# Patient Record
Sex: FEMALE | Race: WHITE
[De-identification: names, ages, dates, MRNs, and addresses within clinical notes are randomized per-mention and may not be internally consistent; named-entity substitution may affect disease eponyms.]

---

## 2019-12-11 ENCOUNTER — HOSPITAL ENCOUNTER (OUTPATIENT)
Dept: HOSPITAL 84 - D.HCCECHO | Age: 74
Discharge: HOME | End: 2019-12-11
Attending: INTERNAL MEDICINE
Payer: MEDICARE

## 2019-12-11 DIAGNOSIS — I35.1: Primary | ICD-10-CM

## 2019-12-31 ENCOUNTER — HOSPITAL ENCOUNTER (OUTPATIENT)
Dept: HOSPITAL 84 - D.CATH | Age: 74
End: 2019-12-31
Attending: INTERNAL MEDICINE
Payer: MEDICARE

## 2019-12-31 VITALS — SYSTOLIC BLOOD PRESSURE: 179 MMHG | DIASTOLIC BLOOD PRESSURE: 61 MMHG

## 2019-12-31 VITALS — WEIGHT: 138.29 LBS | HEIGHT: 59 IN | BODY MASS INDEX: 27.88 KG/M2

## 2019-12-31 DIAGNOSIS — I35.0: ICD-10-CM

## 2019-12-31 DIAGNOSIS — I10: ICD-10-CM

## 2019-12-31 DIAGNOSIS — R07.9: Primary | ICD-10-CM

## 2019-12-31 LAB
ALT SERPL-CCNC: 25 U/L (ref 10–68)
ANION GAP SERPL CALC-SCNC: 13.3 MMOL/L (ref 8–16)
BASOPHILS NFR BLD AUTO: 0.8 % (ref 0–2)
BUN SERPL-MCNC: 13 MG/DL (ref 7–18)
CALCIUM SERPL-MCNC: 9 MG/DL (ref 8.5–10.1)
CHLORIDE SERPL-SCNC: 101 MMOL/L (ref 98–107)
CHOLEST/HDLC SERPL: 3.6 RATIO (ref 2.3–4.1)
CO2 SERPL-SCNC: 24.7 MMOL/L (ref 21–32)
CREAT SERPL-MCNC: 0.7 MG/DL (ref 0.6–1.3)
EOSINOPHIL NFR BLD: 6.2 % (ref 0–7)
ERYTHROCYTE [DISTWIDTH] IN BLOOD BY AUTOMATED COUNT: 12.7 % (ref 11.5–14.5)
GLUCOSE SERPL-MCNC: 104 MG/DL (ref 74–106)
HCT VFR BLD CALC: 36.7 % (ref 36–48)
HDLC SERPL-MCNC: 50 MG/DL (ref 32–96)
HGB BLD-MCNC: 12.2 G/DL (ref 12–16)
IMM GRANULOCYTES NFR BLD: 0.2 % (ref 0–5)
LDL-HDL RATIO: 2.3 RATIO (ref 1.5–3.5)
LDLC SERPL-MCNC: 115 MG/DL (ref 0–100)
LYMPHOCYTES NFR BLD AUTO: 36.1 % (ref 15–50)
MCH RBC QN AUTO: 31.5 PG (ref 26–34)
MCHC RBC AUTO-ENTMCNC: 33.2 G/DL (ref 31–37)
MCV RBC: 94.8 FL (ref 80–100)
MONOCYTES NFR BLD: 7.4 % (ref 2–11)
NEUTROPHILS NFR BLD AUTO: 49.3 % (ref 40–80)
OSMOLALITY SERPL CALC.SUM OF ELEC: 269 MOSM/KG (ref 275–300)
PLATELET # BLD: 384 10X3/UL (ref 130–400)
PMV BLD AUTO: 9.7 FL (ref 7.4–10.4)
POTASSIUM SERPL-SCNC: 4 MMOL/L (ref 3.5–5.1)
RBC # BLD AUTO: 3.87 10X6/UL (ref 4–5.4)
SODIUM SERPL-SCNC: 135 MMOL/L (ref 136–145)
TRIGL SERPL-MCNC: 86 MG/DL (ref 30–200)
WBC # BLD AUTO: 9.1 10X3/UL (ref 4.8–10.8)

## 2019-12-31 NOTE — HEMODYNAMI
PATIENT:MARLENA JUAN                                       MEDICAL RECORD: [id number]
: 47                                            LOCATION:D.CAT          
ACCT# [id number]                                       ADMISSION DATE: 19
 
 
 Generatedon:201912:42
Patient name: MARLENA JUAN Patient #: [id number] Visit #: [id number] SSN: [id number]
837 : 1945
Date of study: 2019
Page: Of
Hemodynamic Procedure Report
****************************
Patient Data
Patient Demographics
Procedure consent was obtained
First Name: MARLENA           Gender: Female
Last Name: DRAKE             : 1945
Patient #: [id number]       Age: 74 year(s)
Visit #: [id number]       Race: 
SSN: [id number]
Accession #:
[id number]
Additional ID: [id number]
Contact details
Address: [address] [place] [address]       Phone: [phone]
State: [place]
City: [place]
Zip code: [address]
Past Medical History
Performed procedures and imaging results
Date     Procedure          Procedure Results  Comments
Echocardiography
History of disease
Date         Diagnosis          Comments
Valvular heart
disease
Allergies
Allergen        Reaction        Date         Comments
Reported
Other allergy                   2019   AMLODIPINE,
LISINOPRIL, PCN,
PROCARDIA
Admission
Admission Data
Admission Date: 2019  Admission Time: 8:51
Lab Results
Lab Result Date: 2019 Lab Result Time: 0:00
Biochemistry
Name         Units    Result                Min      Max
BUN          mg/dl    13       --(--*-)--   7        18
Creatinine   mg/dl    0.7      --(*---)--   0.6      1.3
eGFR         ml/min   87       -*(----)--   90       120
NONAFRICAN
CBC
Name         Units    Result                Min      Max
Hematocrit   %        36.7     *-(----)--   42       54
Hemoglobin   g/dl     12.2     *-(----)--   13.5     17.5
Procedure
Procedure Types
 
Cath Procedure
Diagnostic Procedure
[facility] w/Coronaries
Procedure Description
Procedure Date
Procedure Date: 2019
Procedure Start Time: 12:32
Procedure End Time: 12:39
Procedure Staff
Name                            Function
Davi Nix MD                Performing Physician
Hiwot Love RT               Scrub
Stephanie Yu RT                Monitor
Ronnell Burroughs RT                     Circulator
Tommy Melgar RN                  Nurse
Procedure Data
Cath Procedure
Fluoroscopy
Diagnostic fluoroscopy      Total fluoroscopy Time: 0.9
time: 0.9 min               min
Diagnostic fluoroscopy      Total fluoroscopy dose: 297
dose: 297 mGy               mGy
Contrast Material
Contrast Material Type                       Amount (ml)
Isovue 370                                   35
Entry Location
Entry     Primary  Successful  Side  Size  Upsize Upsize Entry    Closure     Cortes
ccessful  Closure
Location                             (Fr)  1 (Fr) 2 (Fr) Remarks  Device        
          Remarks
Radial                         Right 6 Fr                         Mechanical
artery                               Short                        Compression
Estimated blood loss: 5 ml
Diagnostic catheters
Device Type               Used For           End Catheter
Placement
DIAGNOSTIC Whiting 110cm 5  Procedure
Fr catheter (552647)
Procedure Complications
No complications
Procedure Medications
Medication           Administration Route Dosage
Oxygen               etCO2 Nasal cannula  2 l/min
Lidocaine 2%         added to field       20
Heparin Flush Bag    added to field       2 bags
(1000units/500ml NS)
0.9% NaCl            I.V.                 100 ml/hr
Radial Cocktail      I.A.                 1 syringe
(Verapamil 2mg/Nitro
400mcg/Heparin
1500units)
Versed               I.V.                 1 mg
Fentanyl             I.V.                 50 mcg
Hemodynamics
Rest
HGB: 12.2 (g/dl) Heart Rate: 81 (bpm)
Pressure Samples
Time     Site     Value (mmHg) Purpose      Heart      Use
Rate(bpm)
 
12:34    LV       140/48,79    Snapshot     86
12:35    AO       98/48(68)    Pullback     79
12:35    LV       179/13,18    Pullback     79
Gradients
Valve  Time  Site 1    Site 2    Mean    SEP/DFP    Peak To Heart  Use
(mmHg)  (sec/min)  Peak    Rate
(mmHg)  (bpm)
Aortic 12:35 LV        AO        56      29         81      79
179/13,18 98/48(68)
Calculations
Valve    P-P      Mean      Valve     Index  Valve    Source
Name              Gradient  Area             Flow
(cm2)
Aortic   81       56
81       56
Snapshots
Pre Cath      Intra         NCS           Post Cath
Vital Signs
Time     Heart  Resp   SPO2 etCO2   NIBP (mmHg) Rhythm  Pain Status  Sedation
Rate   (ipm)  (%)  (mmHg)                                   Level
(bpm)
12:17:20 75     15     98   0       176/69(123) NSR     10 (11) ,    10(A)
Unimaginable
unspeakable
12:21:53 73     15     99   0       167/70(127) NSR     10 (11) ,    10(A)
Unimaginable
unspeakable
12:26:23 70     14     98   0       151/61(124) NSR     10 (11) ,    10(A)
Unimaginable
unspeakable
12:30:41 69     10     100  36.2    134/60(112) NSR     10 (11) ,    10(A)
Unimaginable
unspeakable
12:34:55 79     16     100  19.5    102/58(86)  NSR     0 (11) , No  9(A)
pain
12:39:09 75     13     99   30.1    108/50(81)  NSR     0 (11) , No  9(A)
pain
Medications
Time     Medication       Route   Dose    Verified Delivered Reason       Notes 
 Effectiveness
by       by
12:29:29 Oxygen           etCO2   2 l/min Davigeovanny [place]    used for
Nasal           Boyd Melgar RN   procedure
cannula
12:29:36 Lidocaine 2%     added   20ml    Davi Tomlinson   for local
to      vial    Boyd Nix MD  anesthetic
field
12:29:44 Heparin Flush    added   2 bags  Davi Tomlinson   used for
Bag              to              Boyd Nix MD  procedure
(1000units/500ml field
NS)
12:29:53 0.9% NaCl        I.V.    100     Davi [place]    Per
ml/hr   Boyd Melgar RN   physician
12:32:32 Versed           I.V.    1 mg    Davi [place]    for sedation
Boyd Melgar RN
12:32:38 Fentanyl         I.V.    50 mcg  Davi [place]    for sedation
Boyd Melgar RN
12:33:24 Radial Cocktail  I.A.    1       Davi Tomlinson   for
(Verapamil               syringe Boyd Nix MD  vasodilation
2mg/Nitro
 
400mcg/Heparin
1500units)
Procedure Log
Time     Note
12:04:06 Ronnell BARKER(R) sent for patient. Start room use.
12:04:28 Informed consent obtained and on chart
12:04:57 Procedure Status Elective Heart Cath (OP).
12:04:59 Time tracking: Regular hours (M-F 7:00 - 5:00)
12:05:04 Plan of Care:Hemodynamics will remain stable., Cardiac rhythm will
remain stable., Comfort level will be maintained., Respiratory function
will remain adequate., Patient/ family verbilizes understanding of
procedure., Procedure tolerated without complication., Recovers from
procedure without complications..
12:05:15 H&P Date Dictated: 2019 Within 30 days and on chart., H&P Addendu
m
completed by physician on day of procedure. (MUST COMPLETE FOR ALL
OUTPATIENTS).
12:05:43 Patient allergic to Other allergyAMLODIPINE, LISINOPRIL, PCN, PROCARDIA
12::38 Lab Result : BUN 13 mg/dl
12::38 Lab Result : Creatinine 0.7 mg/dl
12::38 Lab Result : eGFR NONAFRICAN 87 ml/min
12::38 Lab Result : Hemoglobin 12.2 g/dl
12::38 Lab Result : Hematocrit 36.7 %
12::40 Risk of Mortality: .2
12:09:45 Risk of blood transfusion: 2.1
12:09:50 Risk of JORDAN: .9
12:11:49 Procedure type changed to Cath procedure, Diagnostic procedure, LHC, LH
C
w/Coronaries
12:12:18 Patient received from Pre/Post Procedure Room to CCL 1 Alert and
oriented. Tansferred to table in Supine position.
12:12:20 Warm blankets applied, and camelia hugger turned on for patient comfort.
12:12:21 Correct patient and procedure confirmed by team.
12:12:21 ECG and BP/O2 sat monitors applied to patient.
12:15:59 Pre-procedure instructions explained to patient.
12:16:00 Pre-op teaching completed and patient verbalized understanding.
12:16:13 Family in waiting room.
12:16:15 Patient NPO since Midnight.
12:16:17 -----------------------------------------------------------------------
-
12:16:21 Is the patient allergic to Iodine/contrast media? No.
12:16:23 Was the patient premedicated? Yes
12:16:40 Lab results completed and on chart.
12:16:45 Stress Test: no; N/A ?
12:17:12 IV patent on arrival in right antecubital with 0.9% NaCl at KVO.
12:17:17 Pre procedure: right dorsailis pedis pulse 2+ Normal; easily
identifiable; not easily obliterated
12:17:19 Modified Neeraj's test Ulnar < 7 seconds
12:17:22 Patient pain scale 10/10 allover pain.
12:17:29 Right Radial & Right Groin area was prepped with chlora-prep and draped
in sterile fashion
12:17:30 Alarms reviewed by R. N.
12:17:31 Sharps counted by scrub and verified by R.N.
12:19:58 Previous problem with sedation/anesthesia? No ?
12:19:59 Snore? Yes
12:20:02 Sleep apnea? No
12:20:04 Deviated septum? No
12:20:05 Opens mouth fully? Yes
12:20:06 Sticks out tongue? Yes
12:20:11 Airway obstruction? No ?
 
12:20:20 Dentures? No ?
12:20:27 Vital chart was started
12:20:28 Baseline sample Acquired.
12:20:55 Baseline sample Acquired.
12:20:59 Rhythm: sinus rhythm
12:21:07 Is patient on blood thinner?No
12:21:10 Patient diabetic? No.
12:21:13 Patient not pregnant. Patient is over age 55.
12:21:16 ----Pre-sedation anethsthesia assessment.----
12:21:25 Use device set Radial Dx or PCI
12:21:27 ACIST Syringe (74639) opened to sterile field.
12:21:27 Medline Cath Pack ([id number]) opened to sterile field.
12:21:28 Bag Decanter () opened to sterile field.
12:21:29 ACIST Hand Control (51607) opened to sterile field.
12:21:29 ACIST Manifold (24895) opened to sterile field.
12:21:31 Tegaderm 4 x 4 (1626W) opened to sterile field.
12:21:32 MBrace Wrist Support (950913191) opened to sterile field.
12:21:33 EMERALD Guide Wire (788-379) opened to sterile field.
12:21:34 SHEATH 6FR RAIN (4596924) opened to sterile field.
12:29:29 Oxygen 2 l/min etCO2 Nasal cannula was administered by Tommy Melgar RN;
used for procedure; Verbal order read back and verified.
12:29:36 Lidocaine 2% 20ml vial added to field was administered by Davi Nix MD; for local anesthetic; Verbal order read back and verified.
12:29:44 Heparin Flush Bag (1000units/500ml NS) 2 bags added to field was
administered by Davi Nix MD; used for procedure; Verbal order read
back and verified.
12:29:53 0.9% NaCl 100 ml/hr I.V. was administered by Tommy Melgar RN; Per
physician; Verbal order read back and verified.
12:31:32 --------ALL STOP TIME OUT------
12:31:33 Final Timeout: patient, procedure, and site verified with staff and
physician. All members of the team are in agreement.
12:31:35 Right Radial & Right Groin site verified by team.
12:31:39 Fire Safety Assessment: A--An alcohol-based skin anteseptic being used
preoperatively., C--Open oxygen or nitrous oxide is being used., D--An
ESU, laser, or fiber-optic light is being used.
12:31:43 Physical assessment completed. ASA score P 2 - A patient with mild
systemic disease as per Davi Nix MD.
12:31:49 2) 60-89 Mildly reduced kidney function, and other findings (as for
stage 1) point to kidney disease.
12:31:52 Maximum allowable contrast dose (3.7 X eGFR X 0.75)240 ml.
12:31:55 Sedation plan: IV Moderate Sedation Medication:Versed, Fentanyl
12:32:00 Procedure started.
12:32:00 Full Disclosure recording started
12:32:10 Local anesthetic to right radial artery with Lidocaine 2% by Davi Nix MD.**INITIAL ACCESS ONLY**
12:32:32 Versed 1 mg I.V. was administered by Tommy Melgar RN; for sedation;
Verbal order read back and verified.
12:32:38 Fentanyl 50 mcg I.V. was administered by Tommy Melgar RN; for sedation;
Verbal order read back and verified.
12:32:54 A 6 Fr Short sheath was inserted into the Right Radial artery
12:33:24 Radial Cocktail (Verapamil 2mg/Nitro 400mcg/Heparin 1500units) 1 syring
e
I.A. was administered by Davi Nix MD; for vasodilation; Verbal
order read back and verified.
12:34:19 A DIAGNOSTIC Tiger 110cm 5 Fr catheter (203707) was advanced over the
wire and used for Procedure.
12:34:23 Injector settings: Ml/sec: 5, Volume: 15,
12:34:28 LV gram done using MEJIA
12:34:57 LV hemodynamics recorded.
12:35:25 EF : 65 %
 
12:35:28 LCA angiography performed.
12:36:11 RCA angiography performed.
12:36:31 **ACC**Dominant side:Right
12:36:38 Catheter removed.
12:37:21 Sheath removed intact; hemostasis achieved with Mechanical Compression
to the Right Radial artery.
12:37:24 Procedure ended.(Physican Out)
12:37:33 Fluoroscopy time 00.90 minutes.
12:37:37 Fluoroscopy dose: 297 mGy
12:37:37 Flurop Dose total: 297
12:37:42 Dose Area Product 49250 mGy/cm.
12:37:47 Contrast amount:Isovue 370 35ml.
12:37:49 Maximum allowable dose exceeded? No.
12:37:51 Sharps counted by scrub and verified by R.N.
12:37:54 Hartley band inflated with 9cc of air.
12:37:57 Post Procedure Pulses reassessed and unchanged
12:38:01 Post procedure: right dorsailis pedis pulse 2+ Normal; easily
identifiable; not easily obliterated.
12:38:05 Post-procedure physical assessment completed. ASA score P 2 - A patient
with mild systemic disease as per Davi Nix MD.
12:38:08 Post procedure rhythm: unchanged.
12:38:11 Estimated blood loss: 5 ml
12:38:12 Post procedure instruction explained to patient.Patient verbalizes
understanding.
12:38:13 Patient needs reinforcement of post procedure teaching.
12:39:29 Procedure and supply charges have been captured, reviewed, submitted an
d
are correct.
12:39:34 Procedure Complication : No complications
12:39:37 Vital chart was stopped
12:39:39 [facility] Findings: mild to moderate CAD (<70%)
12:39:40 Operative report dictated upon procedure completion.
12:39:41 See physician's report for complete and final results.
12:39:44 Report given to Pre/Post Procedure Room.
12:39:55 Patient transfered to Pre/Post Procedure Room with Stretcher.
12:39:57 Procedure ended.
12:39:57 Full Disclosure recording stopped
12:40:05 End room use (Document Last)
12:40:16 End room use (Document Last)
12:40:37 End room use (Document Last)
Device Usage
Item Name   Manufacture  Quantity  Catalog      Hospital Part     Current Minima
l Lot# /
Number       Charge   Number   Stock   Stock   Serial#
Code
ACIST       Acist        1         38270        985354   831796   560882  20
Syringe     Medical
(26108)     Systems Inc
Medline     Medline      1         [id number]    341784   61562    140766  5
Cath Pack
([id number])
Bag         Microtek     1         S        144805   30312    291896  5
Decanter    Medical Inc.
(S)
ACIST Hand  Acist        1         19782        151548   021605   417876  5
Control     Medical
(88103)     Systems Inc
ACIST       Acist        1         59623        932604   141379   321225  5
Manifold    Medical
(84088)     Systems Inc
 
Tegaderm 4  3M           1         1626W        423919   986542   832390  5
x 4 (1626W)
MBrace      Advanced     1         140-0250-00  403737   46871    562318  5
Wrist       Vascular
Support     Dynamics
(631059554)
EMERALD     Cardinal     1         477-513      882802   369886   638254  5
Guide Wire  Health
(479-290)
SHEATH 6FR  Cardinal     1         5220147      423554   4643100  758860  5
[facility]
(4449162)
DIAGNOSTIC  Terumo       1               881304   605341   909727  5
Tiger 110cm
5 Fr
catheter
(945091)
Signature Audit [place]
Stage           Time        Signature      Unsigned
Intra-Procedure 2019  Stephanie Yu
12:40:16 PM RT(R)
Intra-Procedure 2019  Tommy Melgar RN
12:40:38 PM
Intra-Procedure 2019  Davi Nix
12:42:50 PM MD
 
 
 
 
 
 
 
 
 
 
 
 
 
 
 
 
 
 
 
 
 
 
 
 
 
 
 
 
 
 
[facility]                                          
[address] [place] [address]

## 2019-12-31 NOTE — NUR
REMAINING  AIR REMOVED FROM Z BAND, NO S/S OF BLEEDING OR HEMATOMA.
NO C/O PAIN, NUMBNESS, OR TINGLING. DRESSING APPLIED IS CDI. VSS ON
ROOM AIR. PATIENT TOLERAATING PO FLUIDS AND FOOD. IV REMOVED. PATIENT
DISCONNECTED FROM MONITORS TO GET DRESSED. NO N/V.

## 2019-12-31 NOTE — NUR
PATIENT RESTING, FAMILY PRESENT AT BEDSIDE. VSS ON ROOM AIR. RIGHT
RADIAL DRESSING IS CDI, NO S/S OF BLEEDING OR HEMATOMA. NO C/O PAIN,
NUMBNESS, OR TINGLING. NO N/V.

## 2019-12-31 NOTE — NUR
3CC OF AIR REMOVED, NO S/S OF BLEEDING OR HEMATOMA. NO C/O PAIN,
NUMBNESS, OR TINGLING. NO N/V, TOLERATING PO FLUIDS. VSS ON ROOM AIR.
FAMILY PRESENT AT BEDSIDE.

## 2019-12-31 NOTE — NUR
PATIENT VOIDED WITHOUT DIFFICULTY. RIGHT RADIAL DRESSING IS CDI, NO
S/S OF BLEEDING OR HEMATOMA. PATIENT TRANSPORTED VIA WHEELCHAIR TO
CAR WITH FAMILY DRIVING. ALL BELONGINGS WITH PATIENT.

## 2019-12-31 NOTE — OP
PATIENT NAME:  MARLENA JUAN                                MEDICAL RECORD: [id number]
:47                                             LOCATION:D.CAT          
                                                         ADMISSION DATE:        
SURGEON:  RADHA DAVALOS MD             
 
 
DATE OF OPERATION:  2019
 
Date of catheterization is 2019.
 
INDICATION:  Aortic valve replacement.
 
PROCEDURE IN DETAIL:  After informed consent was obtained and after detailed
description of risks, benefits as well as alternative therapies, the patient
elected to proceed with angiogram and heart catheterization.  The right radial
area was prepped and draped in normal sterile fashion.  Radial artery was
cannulated via modified Seldinger technique with placement of 5-Maldivian sheath. 
All catheters exchanged through this sheath.
 
FINDINGS:  Left ventriculogram was performed in standard 30-degree MEJIA view,
reveals normal cardiac wall motion, ejection fraction 65%.  There is a 100-mm
gradient across the aortic valve.
 
SELECTIVE CORONARY ANGIOGRAPHY:  Left main, left anterior descending, left
circumflex, right coronary artery has mild irregularities, but no flow-limiting
stenosis.
 
OVERALL IMPRESSION:
1.  No significant coronary artery disease is present.
2.  Severe aortic stenosis, evaluate for aortic valve replacement.
 
TRANSINT:[id number] Voice Confirmation ID: [id number] DOCUMENT ID: [id number]
                                           
                                           RADHA DAVALOS MD             
 
 
 
 
 
 
 
 
 
 
 
 
 
 
 
 
CC:                                                             [id number]
DICTATION DATE: 19 1241     :     19 2147      [facility] CLI 
                                                                      [address] [place] [address]

## 2019-12-31 NOTE — NUR
PATIENT RESTING, VSS ON ROOM AIR. RIGHT Z BAND WITH IMMOBILIZER IN
PLACE, NO S/S OF BLEEDING OR HEMATOMA. NO C/O PAIN, NUMBNESS, OR
TINGLING. NO N/V.

## 2019-12-31 NOTE — NUR
WRITTEN AND VERBAL DISCHARGE INSTRUCTIONS GIVEN TO PATIENT AND
FAMILY, BOTH VOICE UNDERSTANDING. RIGHT RADIAL DRESSING IS CDI, NO
S/S OF BLEEDING OR HEMATOMA. NO N/V. NO C/O PAIN, NUMBNESS,
OR TINGLING.

## 2019-12-31 NOTE — NUR
PATIENT ARRIVED TO ROOM 4, PLACED ON CM. VSS ON ROOM AIR. RIGHT Z
BAND IN PLACE, NO S/S OF BLEEDING OR HEMATOMA. FAMILY PRESENT AT
BEDSIDE PREVIOUSLY UPDATED BY PHYSICIAN.

## 2020-01-09 ENCOUNTER — HOSPITAL ENCOUNTER (INPATIENT)
Dept: HOSPITAL 84 - D.SDCHOLD | Age: 75
LOS: 10 days | DRG: 219 | End: 2020-01-19
Attending: THORACIC SURGERY (CARDIOTHORACIC VASCULAR SURGERY) | Admitting: THORACIC SURGERY (CARDIOTHORACIC VASCULAR SURGERY)
Payer: MEDICARE

## 2020-01-09 VITALS
BODY MASS INDEX: 28.26 KG/M2 | WEIGHT: 140.19 LBS | BODY MASS INDEX: 28.26 KG/M2 | HEIGHT: 59 IN | WEIGHT: 140.19 LBS | BODY MASS INDEX: 28.26 KG/M2 | HEIGHT: 59 IN | BODY MASS INDEX: 28.26 KG/M2

## 2020-01-09 DIAGNOSIS — J90: ICD-10-CM

## 2020-01-09 DIAGNOSIS — I10: ICD-10-CM

## 2020-01-09 DIAGNOSIS — E87.2: ICD-10-CM

## 2020-01-09 DIAGNOSIS — I95.9: ICD-10-CM

## 2020-01-09 DIAGNOSIS — B96.5: ICD-10-CM

## 2020-01-09 DIAGNOSIS — I26.99: ICD-10-CM

## 2020-01-09 DIAGNOSIS — I48.91: ICD-10-CM

## 2020-01-09 DIAGNOSIS — I35.0: Primary | ICD-10-CM

## 2020-01-09 DIAGNOSIS — R57.0: ICD-10-CM

## 2020-01-09 DIAGNOSIS — D62: ICD-10-CM

## 2020-01-09 DIAGNOSIS — N39.0: ICD-10-CM

## 2020-01-09 LAB
ALBUMIN SERPL-MCNC: 4.3 G/DL (ref 3.4–5)
ALP SERPL-CCNC: 53 U/L (ref 46–116)
ALT SERPL-CCNC: 24 U/L (ref 10–68)
ANION GAP SERPL CALC-SCNC: 11 MMOL/L (ref 8–16)
APPEARANCE UR: (no result)
APTT BLD: 37 SECONDS (ref 22.8–39.4)
BACTERIA #/AREA URNS HPF: (no result) /HPF
BASOPHILS NFR BLD AUTO: 0.5 % (ref 0–2)
BILIRUB SERPL-MCNC: 0.31 MG/DL (ref 0.2–1.3)
BILIRUB SERPL-MCNC: NEGATIVE MG/DL
BUN SERPL-MCNC: 14 MG/DL (ref 7–18)
CALCIUM SERPL-MCNC: 9.5 MG/DL (ref 8.5–10.1)
CHLORIDE SERPL-SCNC: 101 MMOL/L (ref 98–107)
CO2 SERPL-SCNC: 29.5 MMOL/L (ref 21–32)
COLOR UR: YELLOW
CREAT SERPL-MCNC: 0.7 MG/DL (ref 0.6–1.3)
EOSINOPHIL NFR BLD: 3.7 % (ref 0–7)
ERYTHROCYTE [DISTWIDTH] IN BLOOD BY AUTOMATED COUNT: 12.6 % (ref 11.5–14.5)
EST. AVERAGE GLUCOSE BLD GHB EST-MCNC: 103 MG/DL (ref 74–154)
GLOBULIN SER-MCNC: 3.6 G/L
GLUCOSE SERPL-MCNC: 94 MG/DL (ref 74–106)
GLUCOSE SERPL-MCNC: NEGATIVE MG/DL
HCT VFR BLD CALC: 38.6 % (ref 36–48)
HGB BLD-MCNC: 12.8 G/DL (ref 12–16)
IMM GRANULOCYTES NFR BLD: 0.2 % (ref 0–5)
INR PPP: 0.95 (ref 0.85–1.17)
KETONES UR STRIP-MCNC: NEGATIVE MG/DL
LYMPHOCYTES NFR BLD AUTO: 35.1 % (ref 15–50)
MCH RBC QN AUTO: 31.3 PG (ref 26–34)
MCHC RBC AUTO-ENTMCNC: 33.2 G/DL (ref 31–37)
MCV RBC: 94.4 FL (ref 80–100)
MONOCYTES NFR BLD: 6.7 % (ref 2–11)
MUCOUS THREADS #/AREA URNS LPF: (no result) /LPF
NEUTROPHILS NFR BLD AUTO: 53.8 % (ref 40–80)
NITRITE UR-MCNC: NEGATIVE MG/ML
OSMOLALITY SERPL CALC.SUM OF ELEC: 276 MOSM/KG (ref 275–300)
PH UR STRIP: 5 [PH] (ref 5–6)
PHOSPHATE SERPL-MCNC: 3.5 MG/DL (ref 2.5–4.9)
PLATELET # BLD: 354 10X3/UL (ref 130–400)
PMV BLD AUTO: 10.2 FL (ref 7.4–10.4)
POTASSIUM SERPL-SCNC: 3.5 MMOL/L (ref 3.5–5.1)
PROT SERPL-MCNC: 7.9 G/DL (ref 6.4–8.2)
PROT UR-MCNC: NEGATIVE MG/DL
PROTHROMBIN TIME: 12.7 SECONDS (ref 11.6–15)
RBC # BLD AUTO: 4.09 10X6/UL (ref 4–5.4)
RBC #/AREA URNS HPF: (no result) /HPF (ref 0–5)
SODIUM SERPL-SCNC: 138 MMOL/L (ref 136–145)
SP GR UR STRIP: 1.02 (ref 1–1.02)
SQUAMOUS #/AREA URNS HPF: (no result) /HPF (ref 0–5)
T4 FREE SERPL-MCNC: 1.12 NG/DL (ref 0.76–1.46)
TSH SERPL-ACNC: 1.45 UIU/ML (ref 0.36–3.74)
URATE SERPL-MCNC: 3.1 MG/DL (ref 2.6–7.2)
UROBILINOGEN UR-MCNC: NORMAL MG/DL
WBC # BLD AUTO: 10.2 10X3/UL (ref 4.8–10.8)
WBC #/AREA URNS HPF: (no result) /HPF

## 2020-01-09 NOTE — OP
PATIENT NAME:  MARLENA JUAN                                MEDICAL RECORD: [id number]
:47                                             LOCATION:[facility]    D.CV04
                                                         ADMISSION DATE:20
SURGEON:  NEY MATA MD            
 
 
DATE OF OPERATION:  2020
 
SURGEON:  Ney Mata MD
 
ANESTHESIOLOGIST:  Dr. Sandoval.
 
PREOPERATIVE DIAGNOSIS:  Cardiogenic shock and CPR.
 
PROCEDURE:  ICU sternotomy and mediastinal exploration with open cardiac
massage.
 
POSTOPERATIVE DIAGNOSIS:  Probable pulmonary embolus.
 
DESCRIPTION OF PROCEDURE:  In the intensive care unit, code blue protocol was
undertaken.  I arrived to find the patient with vital signs about 80/30 a
junctional rhythm in the 70-80 range.  Right groin was prepped and draped. 
Right femoral line insertion.  Initially, the artery was hit, the blood was red.
 The femoral arterial venous line insertion was then taken over by cardiology
and the chest was quickly prepped.  Sutures removed and wires removed.  Chest
opened.  There was no blood within the pericardium.  The aortic pressure was
approximately 80.  There was no evidence of dissection.  The right heart was
appropriately filled.  Open cardiac massage was undertaken.  Additional
resuscitation was given and the patient was eventually returned to a normal
blood pressure about 110/70 for about 20 minutes using low dose dobutamine and
relatively high dose epinephrine, the acidosis was corrected.  The patient's
blood pressure began to spiral down and intermittent open cardiac massage was
necessary more frequently.  Eventually under the direction of anesthesia,
Levophed and vasopressin were given with no ability to maintain blood pressure. 
It was felt that returned to cardiopulmonary bypass would be unsuccessful due to
the inability to maintain a blood pressure in this patient even with open
cardiac massage death was declared.  The chest was closed with wires and suture.
 
TRANSINT:[id number] Voice Confirmation ID: [id number] DOCUMENT ID: [id number]
                                           
                                           NEY MATA MD            
 
 
 
 
 
 
 
 
 
CC:                                                             [id number]
DICTATION DATE: 20 1330     :     20 1837      DIS IN  
                                                                      20
[facility]                                          
[address] [id number]

## 2020-01-09 NOTE — NUR
NOTIFIED  AND DANE GRACIA RN OF BLOOD PRESSURE 205/63 RIGHT ARM,
BLOOD PRESSURE 176/64 LEFT ARM. DANE VISITED WITH PATIENT AND HER SON AND
DAUGHTER IN ROOM 2501 TO ANSWER THEIR QUESTIONS.

## 2020-01-13 VITALS — SYSTOLIC BLOOD PRESSURE: 129 MMHG | DIASTOLIC BLOOD PRESSURE: 52 MMHG

## 2020-01-13 VITALS — SYSTOLIC BLOOD PRESSURE: 147 MMHG | DIASTOLIC BLOOD PRESSURE: 53 MMHG

## 2020-01-13 VITALS — SYSTOLIC BLOOD PRESSURE: 122 MMHG | DIASTOLIC BLOOD PRESSURE: 53 MMHG

## 2020-01-13 VITALS — DIASTOLIC BLOOD PRESSURE: 46 MMHG | SYSTOLIC BLOOD PRESSURE: 113 MMHG

## 2020-01-13 VITALS — DIASTOLIC BLOOD PRESSURE: 49 MMHG | SYSTOLIC BLOOD PRESSURE: 128 MMHG

## 2020-01-13 VITALS — SYSTOLIC BLOOD PRESSURE: 126 MMHG | DIASTOLIC BLOOD PRESSURE: 51 MMHG

## 2020-01-13 VITALS — DIASTOLIC BLOOD PRESSURE: 49 MMHG | SYSTOLIC BLOOD PRESSURE: 125 MMHG

## 2020-01-13 VITALS — SYSTOLIC BLOOD PRESSURE: 141 MMHG | DIASTOLIC BLOOD PRESSURE: 53 MMHG

## 2020-01-13 VITALS — DIASTOLIC BLOOD PRESSURE: 49 MMHG | SYSTOLIC BLOOD PRESSURE: 133 MMHG

## 2020-01-13 VITALS — DIASTOLIC BLOOD PRESSURE: 44 MMHG | SYSTOLIC BLOOD PRESSURE: 113 MMHG

## 2020-01-13 VITALS — SYSTOLIC BLOOD PRESSURE: 143 MMHG | DIASTOLIC BLOOD PRESSURE: 55 MMHG

## 2020-01-13 VITALS — SYSTOLIC BLOOD PRESSURE: 118 MMHG | DIASTOLIC BLOOD PRESSURE: 46 MMHG

## 2020-01-13 VITALS — SYSTOLIC BLOOD PRESSURE: 125 MMHG | DIASTOLIC BLOOD PRESSURE: 52 MMHG

## 2020-01-13 VITALS — SYSTOLIC BLOOD PRESSURE: 135 MMHG | DIASTOLIC BLOOD PRESSURE: 57 MMHG

## 2020-01-13 VITALS — SYSTOLIC BLOOD PRESSURE: 115 MMHG | DIASTOLIC BLOOD PRESSURE: 45 MMHG

## 2020-01-13 VITALS — DIASTOLIC BLOOD PRESSURE: 49 MMHG | SYSTOLIC BLOOD PRESSURE: 132 MMHG

## 2020-01-13 VITALS — DIASTOLIC BLOOD PRESSURE: 48 MMHG | SYSTOLIC BLOOD PRESSURE: 110 MMHG

## 2020-01-13 VITALS — DIASTOLIC BLOOD PRESSURE: 51 MMHG | SYSTOLIC BLOOD PRESSURE: 137 MMHG

## 2020-01-13 VITALS — SYSTOLIC BLOOD PRESSURE: 152 MMHG | DIASTOLIC BLOOD PRESSURE: 46 MMHG

## 2020-01-13 VITALS — DIASTOLIC BLOOD PRESSURE: 64 MMHG | SYSTOLIC BLOOD PRESSURE: 193 MMHG

## 2020-01-13 VITALS — DIASTOLIC BLOOD PRESSURE: 51 MMHG | SYSTOLIC BLOOD PRESSURE: 144 MMHG

## 2020-01-13 VITALS — SYSTOLIC BLOOD PRESSURE: 129 MMHG | DIASTOLIC BLOOD PRESSURE: 48 MMHG

## 2020-01-13 VITALS — SYSTOLIC BLOOD PRESSURE: 112 MMHG | DIASTOLIC BLOOD PRESSURE: 44 MMHG

## 2020-01-13 VITALS — DIASTOLIC BLOOD PRESSURE: 52 MMHG | SYSTOLIC BLOOD PRESSURE: 140 MMHG

## 2020-01-13 VITALS — DIASTOLIC BLOOD PRESSURE: 51 MMHG | SYSTOLIC BLOOD PRESSURE: 126 MMHG

## 2020-01-13 VITALS — SYSTOLIC BLOOD PRESSURE: 138 MMHG | DIASTOLIC BLOOD PRESSURE: 49 MMHG

## 2020-01-13 VITALS — DIASTOLIC BLOOD PRESSURE: 44 MMHG | SYSTOLIC BLOOD PRESSURE: 127 MMHG

## 2020-01-13 VITALS — DIASTOLIC BLOOD PRESSURE: 46 MMHG | SYSTOLIC BLOOD PRESSURE: 124 MMHG

## 2020-01-13 VITALS — SYSTOLIC BLOOD PRESSURE: 138 MMHG | DIASTOLIC BLOOD PRESSURE: 54 MMHG

## 2020-01-13 VITALS — DIASTOLIC BLOOD PRESSURE: 49 MMHG | SYSTOLIC BLOOD PRESSURE: 114 MMHG

## 2020-01-13 VITALS — SYSTOLIC BLOOD PRESSURE: 140 MMHG | DIASTOLIC BLOOD PRESSURE: 53 MMHG

## 2020-01-13 VITALS — SYSTOLIC BLOOD PRESSURE: 120 MMHG | DIASTOLIC BLOOD PRESSURE: 51 MMHG

## 2020-01-13 VITALS — SYSTOLIC BLOOD PRESSURE: 143 MMHG | DIASTOLIC BLOOD PRESSURE: 52 MMHG

## 2020-01-13 VITALS — SYSTOLIC BLOOD PRESSURE: 138 MMHG | DIASTOLIC BLOOD PRESSURE: 51 MMHG

## 2020-01-13 VITALS — DIASTOLIC BLOOD PRESSURE: 51 MMHG | SYSTOLIC BLOOD PRESSURE: 136 MMHG

## 2020-01-13 VITALS — DIASTOLIC BLOOD PRESSURE: 47 MMHG | SYSTOLIC BLOOD PRESSURE: 142 MMHG

## 2020-01-13 VITALS — DIASTOLIC BLOOD PRESSURE: 50 MMHG | SYSTOLIC BLOOD PRESSURE: 128 MMHG

## 2020-01-13 VITALS — SYSTOLIC BLOOD PRESSURE: 137 MMHG | DIASTOLIC BLOOD PRESSURE: 51 MMHG

## 2020-01-13 VITALS — DIASTOLIC BLOOD PRESSURE: 53 MMHG | SYSTOLIC BLOOD PRESSURE: 136 MMHG

## 2020-01-13 VITALS — DIASTOLIC BLOOD PRESSURE: 46 MMHG | SYSTOLIC BLOOD PRESSURE: 129 MMHG

## 2020-01-13 VITALS — DIASTOLIC BLOOD PRESSURE: 54 MMHG | SYSTOLIC BLOOD PRESSURE: 137 MMHG

## 2020-01-13 VITALS — SYSTOLIC BLOOD PRESSURE: 127 MMHG | DIASTOLIC BLOOD PRESSURE: 52 MMHG

## 2020-01-13 VITALS — SYSTOLIC BLOOD PRESSURE: 116 MMHG | DIASTOLIC BLOOD PRESSURE: 44 MMHG

## 2020-01-13 VITALS — DIASTOLIC BLOOD PRESSURE: 54 MMHG | SYSTOLIC BLOOD PRESSURE: 152 MMHG

## 2020-01-13 VITALS — DIASTOLIC BLOOD PRESSURE: 57 MMHG | SYSTOLIC BLOOD PRESSURE: 138 MMHG

## 2020-01-13 VITALS — DIASTOLIC BLOOD PRESSURE: 50 MMHG | SYSTOLIC BLOOD PRESSURE: 131 MMHG

## 2020-01-13 VITALS — SYSTOLIC BLOOD PRESSURE: 127 MMHG | DIASTOLIC BLOOD PRESSURE: 48 MMHG

## 2020-01-13 VITALS — SYSTOLIC BLOOD PRESSURE: 147 MMHG | DIASTOLIC BLOOD PRESSURE: 61 MMHG

## 2020-01-13 VITALS — SYSTOLIC BLOOD PRESSURE: 113 MMHG | DIASTOLIC BLOOD PRESSURE: 50 MMHG

## 2020-01-13 VITALS — SYSTOLIC BLOOD PRESSURE: 139 MMHG | DIASTOLIC BLOOD PRESSURE: 52 MMHG

## 2020-01-13 VITALS — SYSTOLIC BLOOD PRESSURE: 105 MMHG | DIASTOLIC BLOOD PRESSURE: 42 MMHG

## 2020-01-13 LAB
APPEARANCE UR: CLEAR
APTT BLD: 35.8 SECONDS (ref 22.8–39.4)
BACTERIA #/AREA URNS HPF: (no result) /HPF
BASOPHILS NFR BLD AUTO: 0.1 % (ref 0–2)
BILIRUB SERPL-MCNC: NEGATIVE MG/DL
COLOR UR: (no result)
EOSINOPHIL NFR BLD: 0.7 % (ref 0–7)
ERYTHROCYTE [DISTWIDTH] IN BLOOD BY AUTOMATED COUNT: 13.9 % (ref 11.5–14.5)
GLUCOSE SERPL-MCNC: NEGATIVE MG/DL
HCT VFR BLD CALC: 24.4 % (ref 36–48)
HGB BLD-MCNC: 8.1 G/DL (ref 12–16)
IMM GRANULOCYTES NFR BLD: 0.4 % (ref 0–5)
INR PPP: 1.4 (ref 0.85–1.17)
KETONES UR STRIP-MCNC: NEGATIVE MG/DL
LYMPHOCYTES NFR BLD AUTO: 13.4 % (ref 15–50)
MCH RBC QN AUTO: 30.3 PG (ref 26–34)
MCHC RBC AUTO-ENTMCNC: 33.2 G/DL (ref 31–37)
MCV RBC: 91.4 FL (ref 80–100)
MONOCYTES NFR BLD: 7.7 % (ref 2–11)
NEUTROPHILS NFR BLD AUTO: 77.7 % (ref 40–80)
NITRITE UR-MCNC: NEGATIVE MG/ML
PH UR STRIP: 5 [PH] (ref 5–6)
PLATELET # BLD: 185 10X3/UL (ref 130–400)
PMV BLD AUTO: 9.7 FL (ref 7.4–10.4)
PROT UR-MCNC: NEGATIVE MG/DL
PROTHROMBIN TIME: 17 SECONDS (ref 11.6–15)
RBC # BLD AUTO: 2.67 10X6/UL (ref 4–5.4)
RBC #/AREA URNS HPF: (no result) /HPF (ref 0–5)
SP GR UR STRIP: 1.02 (ref 1–1.02)
SQUAMOUS #/AREA URNS HPF: (no result) /HPF (ref 0–5)
UROBILINOGEN UR-MCNC: NORMAL MG/DL
WBC # BLD AUTO: 12.4 10X3/UL (ref 4.8–10.8)
WBC #/AREA URNS HPF: (no result) /HPF

## 2020-01-13 PROCEDURE — 02RF08Z REPLACEMENT OF AORTIC VALVE WITH ZOOPLASTIC TISSUE, OPEN APPROACH: ICD-10-PCS | Performed by: THORACIC SURGERY (CARDIOTHORACIC VASCULAR SURGERY)

## 2020-01-13 PROCEDURE — B24BZZ4 ULTRASONOGRAPHY OF HEART WITH AORTA, TRANSESOPHAGEAL: ICD-10-PCS | Performed by: THORACIC SURGERY (CARDIOTHORACIC VASCULAR SURGERY)

## 2020-01-13 PROCEDURE — 5A1221Z PERFORMANCE OF CARDIAC OUTPUT, CONTINUOUS: ICD-10-PCS | Performed by: THORACIC SURGERY (CARDIOTHORACIC VASCULAR SURGERY)

## 2020-01-13 NOTE — NUR
CALLED DR. MATA ABOUT URINE OUTPUT 28 ML/HR X 3 HOURS, AND UPDATED HIM ON
B/P, AND VITALS, NEW ORDERS NOTED

## 2020-01-13 NOTE — NUR
PT C/O PAIN THAT HAS NOT BEEN COMPLETELY RESOLVED BY THE LAST DOSE OF PAIN
MED, PT GIVEN PRN MORPHINE AT THIS TIME

## 2020-01-13 NOTE — NUR
PT ASSESSEMENT COMPLETED AT THIS TIME, NO CHANGES NOTED FROM NURSE REPORT, PT
DROWSY, WAKES UP AND ANSWERS QUESTION APPROPRIATELY. VSS, WILL MONITOR FOR
CHANGES

## 2020-01-13 NOTE — NUR
0550-SPOKE WITH PHANI IN PHARMACY, ANTIBIOTIC NOT IN MED ROOM AND
NITRO PASTE TYSHAWN EMPTY. PHONE REQUEST MADE FOR STOCK.
1972-REQUESTED MEDICATION DELIVERED TO UNIT.

## 2020-01-13 NOTE — NUR
1141 PT ARRIVED TO ROOM WITH OR TEAM VSS ATTACHED TO MONITORING EQUIPMENT AND
ALARMS SET, ETT SECURED R IJ NELLIAN VON LOCKED AT APPROX 50CM THEN MOVED BY DR MATA TO APPROX 40CM, MIDSTERNAL AND SUBSTERNAL DRESSINGS CDI WITH TPM WIRES
SECURED, ATTACHED TO TPM, TPM OFF, CT TO 20CM SUCTION NO AIR LEAK, CRITICORE
DUMAS DRAINING YELLOW URINE, ABGS DONE AND VIEWED BY DR MATA WITH ORDERS FOR
20KCL FAMILY UPDATED AND DENY QUESTIONS, WILL CONTINUE TO MONITOR

## 2020-01-14 VITALS — SYSTOLIC BLOOD PRESSURE: 138 MMHG | DIASTOLIC BLOOD PRESSURE: 49 MMHG

## 2020-01-14 VITALS — SYSTOLIC BLOOD PRESSURE: 140 MMHG | DIASTOLIC BLOOD PRESSURE: 62 MMHG

## 2020-01-14 VITALS — SYSTOLIC BLOOD PRESSURE: 129 MMHG | DIASTOLIC BLOOD PRESSURE: 50 MMHG

## 2020-01-14 VITALS — DIASTOLIC BLOOD PRESSURE: 46 MMHG | SYSTOLIC BLOOD PRESSURE: 132 MMHG

## 2020-01-14 VITALS — DIASTOLIC BLOOD PRESSURE: 52 MMHG | SYSTOLIC BLOOD PRESSURE: 132 MMHG

## 2020-01-14 VITALS — DIASTOLIC BLOOD PRESSURE: 57 MMHG | SYSTOLIC BLOOD PRESSURE: 149 MMHG

## 2020-01-14 VITALS — SYSTOLIC BLOOD PRESSURE: 120 MMHG | DIASTOLIC BLOOD PRESSURE: 51 MMHG

## 2020-01-14 VITALS — DIASTOLIC BLOOD PRESSURE: 54 MMHG | SYSTOLIC BLOOD PRESSURE: 135 MMHG

## 2020-01-14 VITALS — SYSTOLIC BLOOD PRESSURE: 129 MMHG | DIASTOLIC BLOOD PRESSURE: 51 MMHG

## 2020-01-14 VITALS — DIASTOLIC BLOOD PRESSURE: 58 MMHG | SYSTOLIC BLOOD PRESSURE: 126 MMHG

## 2020-01-14 VITALS — DIASTOLIC BLOOD PRESSURE: 42 MMHG | SYSTOLIC BLOOD PRESSURE: 113 MMHG

## 2020-01-14 VITALS — DIASTOLIC BLOOD PRESSURE: 52 MMHG | SYSTOLIC BLOOD PRESSURE: 136 MMHG

## 2020-01-14 VITALS — DIASTOLIC BLOOD PRESSURE: 51 MMHG | SYSTOLIC BLOOD PRESSURE: 143 MMHG

## 2020-01-14 VITALS — DIASTOLIC BLOOD PRESSURE: 48 MMHG | SYSTOLIC BLOOD PRESSURE: 123 MMHG

## 2020-01-14 VITALS — SYSTOLIC BLOOD PRESSURE: 139 MMHG | DIASTOLIC BLOOD PRESSURE: 54 MMHG

## 2020-01-14 VITALS — DIASTOLIC BLOOD PRESSURE: 51 MMHG | SYSTOLIC BLOOD PRESSURE: 126 MMHG

## 2020-01-14 VITALS — DIASTOLIC BLOOD PRESSURE: 54 MMHG | SYSTOLIC BLOOD PRESSURE: 113 MMHG

## 2020-01-14 VITALS — DIASTOLIC BLOOD PRESSURE: 43 MMHG | SYSTOLIC BLOOD PRESSURE: 121 MMHG

## 2020-01-14 VITALS — SYSTOLIC BLOOD PRESSURE: 118 MMHG | DIASTOLIC BLOOD PRESSURE: 58 MMHG

## 2020-01-14 VITALS — DIASTOLIC BLOOD PRESSURE: 51 MMHG | SYSTOLIC BLOOD PRESSURE: 139 MMHG

## 2020-01-14 VITALS — DIASTOLIC BLOOD PRESSURE: 46 MMHG | SYSTOLIC BLOOD PRESSURE: 125 MMHG

## 2020-01-14 VITALS — DIASTOLIC BLOOD PRESSURE: 47 MMHG | SYSTOLIC BLOOD PRESSURE: 135 MMHG

## 2020-01-14 VITALS — DIASTOLIC BLOOD PRESSURE: 51 MMHG | SYSTOLIC BLOOD PRESSURE: 142 MMHG

## 2020-01-14 VITALS — DIASTOLIC BLOOD PRESSURE: 52 MMHG | SYSTOLIC BLOOD PRESSURE: 122 MMHG

## 2020-01-14 VITALS — DIASTOLIC BLOOD PRESSURE: 48 MMHG | SYSTOLIC BLOOD PRESSURE: 130 MMHG

## 2020-01-14 VITALS — DIASTOLIC BLOOD PRESSURE: 63 MMHG | SYSTOLIC BLOOD PRESSURE: 147 MMHG

## 2020-01-14 VITALS — SYSTOLIC BLOOD PRESSURE: 126 MMHG | DIASTOLIC BLOOD PRESSURE: 52 MMHG

## 2020-01-14 VITALS — SYSTOLIC BLOOD PRESSURE: 121 MMHG | DIASTOLIC BLOOD PRESSURE: 42 MMHG

## 2020-01-14 VITALS — SYSTOLIC BLOOD PRESSURE: 149 MMHG | DIASTOLIC BLOOD PRESSURE: 51 MMHG

## 2020-01-14 VITALS — SYSTOLIC BLOOD PRESSURE: 137 MMHG | DIASTOLIC BLOOD PRESSURE: 52 MMHG

## 2020-01-14 VITALS — DIASTOLIC BLOOD PRESSURE: 54 MMHG | SYSTOLIC BLOOD PRESSURE: 138 MMHG

## 2020-01-14 VITALS — SYSTOLIC BLOOD PRESSURE: 134 MMHG | DIASTOLIC BLOOD PRESSURE: 45 MMHG

## 2020-01-14 VITALS — DIASTOLIC BLOOD PRESSURE: 46 MMHG | SYSTOLIC BLOOD PRESSURE: 131 MMHG

## 2020-01-14 VITALS — SYSTOLIC BLOOD PRESSURE: 120 MMHG | DIASTOLIC BLOOD PRESSURE: 41 MMHG

## 2020-01-14 VITALS — DIASTOLIC BLOOD PRESSURE: 46 MMHG | SYSTOLIC BLOOD PRESSURE: 133 MMHG

## 2020-01-14 VITALS — SYSTOLIC BLOOD PRESSURE: 151 MMHG | DIASTOLIC BLOOD PRESSURE: 58 MMHG

## 2020-01-14 VITALS — DIASTOLIC BLOOD PRESSURE: 51 MMHG | SYSTOLIC BLOOD PRESSURE: 124 MMHG

## 2020-01-14 VITALS — DIASTOLIC BLOOD PRESSURE: 48 MMHG | SYSTOLIC BLOOD PRESSURE: 131 MMHG

## 2020-01-14 VITALS — DIASTOLIC BLOOD PRESSURE: 51 MMHG | SYSTOLIC BLOOD PRESSURE: 129 MMHG

## 2020-01-14 VITALS — SYSTOLIC BLOOD PRESSURE: 134 MMHG | DIASTOLIC BLOOD PRESSURE: 49 MMHG

## 2020-01-14 VITALS — DIASTOLIC BLOOD PRESSURE: 45 MMHG | SYSTOLIC BLOOD PRESSURE: 123 MMHG

## 2020-01-14 VITALS — SYSTOLIC BLOOD PRESSURE: 124 MMHG | DIASTOLIC BLOOD PRESSURE: 51 MMHG

## 2020-01-14 VITALS — DIASTOLIC BLOOD PRESSURE: 51 MMHG | SYSTOLIC BLOOD PRESSURE: 138 MMHG

## 2020-01-14 VITALS — SYSTOLIC BLOOD PRESSURE: 120 MMHG | DIASTOLIC BLOOD PRESSURE: 44 MMHG

## 2020-01-14 VITALS — DIASTOLIC BLOOD PRESSURE: 48 MMHG | SYSTOLIC BLOOD PRESSURE: 129 MMHG

## 2020-01-14 VITALS — DIASTOLIC BLOOD PRESSURE: 55 MMHG | SYSTOLIC BLOOD PRESSURE: 131 MMHG

## 2020-01-14 VITALS — SYSTOLIC BLOOD PRESSURE: 145 MMHG | DIASTOLIC BLOOD PRESSURE: 55 MMHG

## 2020-01-14 VITALS — DIASTOLIC BLOOD PRESSURE: 50 MMHG | SYSTOLIC BLOOD PRESSURE: 127 MMHG

## 2020-01-14 VITALS — SYSTOLIC BLOOD PRESSURE: 133 MMHG | DIASTOLIC BLOOD PRESSURE: 47 MMHG

## 2020-01-14 VITALS — SYSTOLIC BLOOD PRESSURE: 122 MMHG | DIASTOLIC BLOOD PRESSURE: 43 MMHG

## 2020-01-14 VITALS — SYSTOLIC BLOOD PRESSURE: 120 MMHG | DIASTOLIC BLOOD PRESSURE: 59 MMHG

## 2020-01-14 VITALS — DIASTOLIC BLOOD PRESSURE: 47 MMHG | SYSTOLIC BLOOD PRESSURE: 114 MMHG

## 2020-01-14 VITALS — DIASTOLIC BLOOD PRESSURE: 50 MMHG | SYSTOLIC BLOOD PRESSURE: 135 MMHG

## 2020-01-14 VITALS — SYSTOLIC BLOOD PRESSURE: 120 MMHG | DIASTOLIC BLOOD PRESSURE: 42 MMHG

## 2020-01-14 VITALS — DIASTOLIC BLOOD PRESSURE: 59 MMHG | SYSTOLIC BLOOD PRESSURE: 123 MMHG

## 2020-01-14 VITALS — DIASTOLIC BLOOD PRESSURE: 46 MMHG | SYSTOLIC BLOOD PRESSURE: 124 MMHG

## 2020-01-14 VITALS — SYSTOLIC BLOOD PRESSURE: 146 MMHG | DIASTOLIC BLOOD PRESSURE: 56 MMHG

## 2020-01-14 VITALS — SYSTOLIC BLOOD PRESSURE: 120 MMHG | DIASTOLIC BLOOD PRESSURE: 47 MMHG

## 2020-01-14 LAB
ALBUMIN SERPL-MCNC: 3.2 G/DL (ref 3.4–5)
ALP SERPL-CCNC: 30 U/L (ref 46–116)
ALT SERPL-CCNC: 20 U/L (ref 10–68)
ANION GAP SERPL CALC-SCNC: 11.1 MMOL/L (ref 8–16)
BILIRUB SERPL-MCNC: 0.52 MG/DL (ref 0.2–1.3)
BUN SERPL-MCNC: 17 MG/DL (ref 7–18)
CALCIUM SERPL-MCNC: 7.7 MG/DL (ref 8.5–10.1)
CHLORIDE SERPL-SCNC: 107 MMOL/L (ref 98–107)
CO2 SERPL-SCNC: 25.6 MMOL/L (ref 21–32)
CREAT SERPL-MCNC: 0.7 MG/DL (ref 0.6–1.3)
ERYTHROCYTE [DISTWIDTH] IN BLOOD BY AUTOMATED COUNT: 14.4 % (ref 11.5–14.5)
GLOBULIN SER-MCNC: 2.5 G/L
GLUCOSE SERPL-MCNC: 142 MG/DL (ref 74–106)
HCT VFR BLD CALC: 24.1 % (ref 36–48)
HGB BLD-MCNC: 7.9 G/DL (ref 12–16)
MCH RBC QN AUTO: 30.2 PG (ref 26–34)
MCHC RBC AUTO-ENTMCNC: 32.8 G/DL (ref 31–37)
MCV RBC: 92 FL (ref 80–100)
OSMOLALITY SERPL CALC.SUM OF ELEC: 282 MOSM/KG (ref 275–300)
PLATELET # BLD: 227 10X3/UL (ref 130–400)
PMV BLD AUTO: 10 FL (ref 7.4–10.4)
POTASSIUM SERPL-SCNC: 3.7 MMOL/L (ref 3.5–5.1)
PROT SERPL-MCNC: 5.7 G/DL (ref 6.4–8.2)
RBC # BLD AUTO: 2.62 10X6/UL (ref 4–5.4)
SODIUM SERPL-SCNC: 140 MMOL/L (ref 136–145)
WBC # BLD AUTO: 14.5 10X3/UL (ref 4.8–10.8)

## 2020-01-14 NOTE — NUR
PATIENT C/O NAUSEA, SPOKE TO STEVIE RN WITH DR. MATA REGARDING ZOFRAN ORDERED
EVERY 8 HRS PRN AND LAST DOSE GIVEN WAS 0820 TODAY WITH NEXT DOSE AT 1600.
OKAY TO CHANGE TO Q4H PRN FOR NAUSEA.

## 2020-01-14 NOTE — NUR
DR. MATA AT ROOM UPDATED AND EXAMINES PATIENT. REMOVES CHEST TUBE, TPM WIRES
REMAIN IN PLACE. VSS. OCCULUSIVE DRESSING WITH BETADINE OINTMENT TO CHEST TUBE
SITE.

## 2020-01-14 NOTE — NUR
BEDSIDE REPORT AND SHIFT ASSESSMENT COMPLETE, SEE FLOWSHEET. VSS, NO SIGNS OF
ACUTE DISTRESS NOTED. MIDSTERNAL INCISION DRESSING CDI, SUBSTERNAL INCISION
AND TPM WIRES DRESSING CDI. PT DENIES ANY PAIN OR NAUSEA AT THIS TIME. SCD'S
AND DENI HOSE IN PLACE, SKIN WNL. PT SITTING UP IN BED EATING ICE CHIPS, CALL
LIGHT IN REACH. BED IN LOWEST POSITION. WILL CONTINUE TO MONITOR.

## 2020-01-14 NOTE — NUR
PT REASSESSMENT COMPLETED AT THIS TIME, PT RESTING WITH EYES CLOSED, RESP EVEN
AND NON LABORED, PT AWAKES TO NAME, NO DISTRESS NOTED

## 2020-01-14 NOTE — NUR
REASSESSMENT COMPLETE, SEE FLOWSHEET. VSS. C/O WEAKNESS IN RUE, WEAK RADIAL
PULSE NOTED, NEURO CHECK COMPLETE - NO SIGNS OF DEFICIT. PULLED UP IN BED,
REPOSITIONED FOR COMFORT. CALL LIGHT IN REACH.

## 2020-01-14 NOTE — NUR
MEDS GIVEN PER MAR AND TOLERATED BY PT, PT REFUSED COLACE. DENIES ANY N/V OR
PAIN. CALL LIGHT IN REACH.

## 2020-01-14 NOTE — OP
PATIENT NAME:  MARLENA JUAN                                MEDICAL RECORD: [id number]
:[birth year]                                             LOCATION:[facility].CV04
                                                         ADMISSION DATE:20
SURGEON:  LEV MATA MD            
 
 
DATE OF OPERATION:  2020
 
SURGEON:  Lev Mata MD
 
ASSISTANT:  None.
 
PROCEDURE:  Aortic valve replacement (19 mm pericardial bioprosthesis).
 
PREOPERATIVE DIAGNOSIS:  Aortic stenosis.
 
POSTOPERATIVE DIAGNOSIS:  Aortic stenosis.
 
ANESTHESIA:  General endotracheal anesthesia.
 
ESTIMATED BLOOD LOSS:  Total cardiopulmonary bypass with Cell Saver
retransfusion and 1 packed red blood cells, 1 platelet and 1 FFP.
 
COMPLICATIONS:  None.
 
SPECIMEN:  Aortic valve leaflets.
 
CONDITION:  Stable.
 
DISPOSITION:   ICU.
 
OPERATIVE FINDINGS:
1.  Transesophageal echocardiography confirmed severe aortic valve calcification
with 0.3 cm-squared aortic valve area.
2.  Calcified leaflets and some calcification extending 2-3 cm above the
sinotubular junction of the anterior wall of the ascending aorta.
3.  No perivalvular leak after replacement.
 
OPERATIVE INDICATIONS:  Aortic stenosis with significant dyspnea and exertional
angina.
 
DESCRIPTION OF PROCEDURE:  The patient was brought to the operating suite. 
General anesthesia was obtained, the patient was prepped and draped.
 
Median sternotomy incision was made.  Subcutaneous tissue was divided with the
electrocautery.  Sternum was divided with a saw.  Pericardium was opened. 
Heparin was given.  Aorta was cannulated.  Dual-stage venous cannula was
inserted.  The patient was placed on cardiopulmonary bypass after activated
clotting times were fully elevated.  Retrograde cardioplegia cannula was
inserted.  The patient was cooled.  Crossclamp was placed.  Cardioplegia was
given antegrade and the angiocatheter and retrograde and then after about 15
minutes direct ostial antegrade cardioplegia was given and then retrograde about
every 20 minutes.  The left ventricular vent was placed.  Leaflets were removed.
 Annulus was debrided.  The valve was sized with appropriate size and pledgeted
sutures were placed from ventricular to aortic side through the valve sewing
ring, valve carefully lowered in place.  Sutures were tied.  I inspected the
valve.  There was no valvular leak.  No evidence of subvalvular obstruction or
perivalvular leak.  The aortotomy was closed.  Left ventricular apex was
 
 
 
OPERATIVE REPORT                               [id number]    MARLENA JUAN              
 
 
de-aired and the crossclamp was removed.  The anterior ascending aorta was
deaired through the cardioplegia site and this was oversewn.  The left
ventricular vent and retrograde cardioplegic cannulas were removed.  The patient
had atrial ventricular pacing was placed, then fully rewarmed, weaned from
cardiopulmonary bypass and was stable.
 
The patient was decannulated.  The cannula sites were oversewn.  Protamine was
given.  Thorough irrigation was undertaken.  Hemostasis was assured.  A drain
was placed in the mediastinum.  The pericardial fat was approximated over the
great vessels.  Sternum was closed with wires.  Fascia was closed.  Subcutaneous
tissue was closed.  Skin was closed.  Dermabond was placed.  The needle and
sponge counts were correct.
 
The patient was taken to the ICU in stable condition.
 
TRANSINT:[id number] Voice Confirmation ID: [id number] DOCUMENT ID: [id number]
                                           
                                           LEV MATA MD            
 
 
 
Electronically Signed by LEV MATA on 20 at 1503
 
 
 
 
 
 
 
 
 
 
 
 
 
 
 
 
 
 
 
 
 
 
 
 
 
CC: GERTRUDE REBOLLEDO and RADHA DAVALOS                             [id number]
DICTATION DATE: 20 1155     :     20 194      ADM IN  
                                                                              
[facility]                                          
[address] [place] [address]

## 2020-01-14 NOTE — NUR
RECEIVED BEDSIDE REPORT ON PATIENT AND ASSUMED CARE. PATIENT ALERT AND
ORIENTED X 4. CHEST TUBE WITH NO OUTPUT NOTED. DUMAS CATH WITH MINIMAL UOP,
YELLOW CLEAR IN COLOR. RIGHT IJ CORDIS CENTRAL LINE THAT IS NSL. VSS. ON RA
WITH SPO2 - 94%. CM - SR 74 WITH NO ECTOPY NOTED. BBS CLEAR AND EQUAL, NON
LABORED, RR - 14.

## 2020-01-14 NOTE — NUR
PT MOANING OUT, WHEN ASKED WHATS WRONG PATIETS STATES THAT SHE DOES NOT FEEL
GOOD, SHE IS HURTING AND FEELS LIKE SHE IS DYING, PT AWAKE, VSS, PULSES 2+
ALL EXT. NO CHANGES NO BREATH SOUNDS, NO CHANGES IN CT OR DUMAS OUTPUT, WILL
MONITOR FOR CHANGES IN PATIENTS COND.

## 2020-01-14 NOTE — NUR
PT RESTING IN BED WITH EYES CLOSED, RESP EVEN AND NON LABORED, PT STATES THAT
HER PAIN IS SOME BETTER THIS MORNING AND SHE IS FEELING BETTER

## 2020-01-14 NOTE — NUR
SPOKE TO DR. MATA REGARDING CHANGING OUT THE DUMAS CATH PER DR. OTTO ORDER, HE
STATES TO NOT CHANGE THE DUMAS OUT THAT SHE HAD RECEIVED ANTIBIOTICS PRIOR TO
INSERTION OF PRESENT CATHETER. CURRENT UA CULTURE INDICATES NO GROWTH AT 1
DAY. RESULTS PENDING.

## 2020-01-14 NOTE — NUR
PT RESTING WITH EYES CLOSE, PT AWAKE TO NAME, PT DROWSY AND FALLS BACK ALSEEP,
NO DISTRES NOTED, VSS

## 2020-01-15 VITALS — SYSTOLIC BLOOD PRESSURE: 119 MMHG | DIASTOLIC BLOOD PRESSURE: 51 MMHG

## 2020-01-15 VITALS — DIASTOLIC BLOOD PRESSURE: 58 MMHG | SYSTOLIC BLOOD PRESSURE: 141 MMHG

## 2020-01-15 VITALS — SYSTOLIC BLOOD PRESSURE: 125 MMHG | DIASTOLIC BLOOD PRESSURE: 59 MMHG

## 2020-01-15 VITALS — SYSTOLIC BLOOD PRESSURE: 142 MMHG | DIASTOLIC BLOOD PRESSURE: 64 MMHG

## 2020-01-15 VITALS — SYSTOLIC BLOOD PRESSURE: 126 MMHG | DIASTOLIC BLOOD PRESSURE: 50 MMHG

## 2020-01-15 VITALS — DIASTOLIC BLOOD PRESSURE: 44 MMHG | SYSTOLIC BLOOD PRESSURE: 90 MMHG

## 2020-01-15 VITALS — DIASTOLIC BLOOD PRESSURE: 51 MMHG | SYSTOLIC BLOOD PRESSURE: 92 MMHG

## 2020-01-15 VITALS — DIASTOLIC BLOOD PRESSURE: 61 MMHG | SYSTOLIC BLOOD PRESSURE: 136 MMHG

## 2020-01-15 VITALS — DIASTOLIC BLOOD PRESSURE: 60 MMHG | SYSTOLIC BLOOD PRESSURE: 123 MMHG

## 2020-01-15 VITALS — DIASTOLIC BLOOD PRESSURE: 54 MMHG | SYSTOLIC BLOOD PRESSURE: 132 MMHG

## 2020-01-15 VITALS — SYSTOLIC BLOOD PRESSURE: 118 MMHG | DIASTOLIC BLOOD PRESSURE: 53 MMHG

## 2020-01-15 VITALS — SYSTOLIC BLOOD PRESSURE: 124 MMHG | DIASTOLIC BLOOD PRESSURE: 55 MMHG

## 2020-01-15 VITALS — DIASTOLIC BLOOD PRESSURE: 66 MMHG | SYSTOLIC BLOOD PRESSURE: 154 MMHG

## 2020-01-15 VITALS — SYSTOLIC BLOOD PRESSURE: 130 MMHG | DIASTOLIC BLOOD PRESSURE: 52 MMHG

## 2020-01-15 VITALS — SYSTOLIC BLOOD PRESSURE: 133 MMHG | DIASTOLIC BLOOD PRESSURE: 60 MMHG

## 2020-01-15 VITALS — SYSTOLIC BLOOD PRESSURE: 135 MMHG | DIASTOLIC BLOOD PRESSURE: 57 MMHG

## 2020-01-15 VITALS — DIASTOLIC BLOOD PRESSURE: 38 MMHG | SYSTOLIC BLOOD PRESSURE: 75 MMHG

## 2020-01-15 VITALS — SYSTOLIC BLOOD PRESSURE: 139 MMHG | DIASTOLIC BLOOD PRESSURE: 52 MMHG

## 2020-01-15 VITALS — SYSTOLIC BLOOD PRESSURE: 144 MMHG | DIASTOLIC BLOOD PRESSURE: 64 MMHG

## 2020-01-15 VITALS — DIASTOLIC BLOOD PRESSURE: 60 MMHG | SYSTOLIC BLOOD PRESSURE: 137 MMHG

## 2020-01-15 VITALS — DIASTOLIC BLOOD PRESSURE: 54 MMHG | SYSTOLIC BLOOD PRESSURE: 126 MMHG

## 2020-01-15 VITALS — SYSTOLIC BLOOD PRESSURE: 135 MMHG | DIASTOLIC BLOOD PRESSURE: 51 MMHG

## 2020-01-15 VITALS — SYSTOLIC BLOOD PRESSURE: 136 MMHG | DIASTOLIC BLOOD PRESSURE: 63 MMHG

## 2020-01-15 VITALS — DIASTOLIC BLOOD PRESSURE: 51 MMHG | SYSTOLIC BLOOD PRESSURE: 121 MMHG

## 2020-01-15 VITALS — DIASTOLIC BLOOD PRESSURE: 48 MMHG | SYSTOLIC BLOOD PRESSURE: 125 MMHG

## 2020-01-15 VITALS — SYSTOLIC BLOOD PRESSURE: 150 MMHG | DIASTOLIC BLOOD PRESSURE: 60 MMHG

## 2020-01-15 VITALS — SYSTOLIC BLOOD PRESSURE: 118 MMHG | DIASTOLIC BLOOD PRESSURE: 42 MMHG

## 2020-01-15 VITALS — SYSTOLIC BLOOD PRESSURE: 119 MMHG | DIASTOLIC BLOOD PRESSURE: 55 MMHG

## 2020-01-15 VITALS — DIASTOLIC BLOOD PRESSURE: 55 MMHG | SYSTOLIC BLOOD PRESSURE: 132 MMHG

## 2020-01-15 VITALS — DIASTOLIC BLOOD PRESSURE: 63 MMHG | SYSTOLIC BLOOD PRESSURE: 137 MMHG

## 2020-01-15 VITALS — DIASTOLIC BLOOD PRESSURE: 66 MMHG | SYSTOLIC BLOOD PRESSURE: 157 MMHG

## 2020-01-15 VITALS — DIASTOLIC BLOOD PRESSURE: 57 MMHG | SYSTOLIC BLOOD PRESSURE: 128 MMHG

## 2020-01-15 VITALS — SYSTOLIC BLOOD PRESSURE: 141 MMHG | DIASTOLIC BLOOD PRESSURE: 63 MMHG

## 2020-01-15 VITALS — DIASTOLIC BLOOD PRESSURE: 60 MMHG | SYSTOLIC BLOOD PRESSURE: 130 MMHG

## 2020-01-15 VITALS — SYSTOLIC BLOOD PRESSURE: 128 MMHG | DIASTOLIC BLOOD PRESSURE: 54 MMHG

## 2020-01-15 VITALS — DIASTOLIC BLOOD PRESSURE: 59 MMHG | SYSTOLIC BLOOD PRESSURE: 156 MMHG

## 2020-01-15 VITALS — DIASTOLIC BLOOD PRESSURE: 50 MMHG | SYSTOLIC BLOOD PRESSURE: 113 MMHG

## 2020-01-15 VITALS — SYSTOLIC BLOOD PRESSURE: 158 MMHG | DIASTOLIC BLOOD PRESSURE: 66 MMHG

## 2020-01-15 VITALS — SYSTOLIC BLOOD PRESSURE: 124 MMHG | DIASTOLIC BLOOD PRESSURE: 62 MMHG

## 2020-01-15 VITALS — DIASTOLIC BLOOD PRESSURE: 50 MMHG | SYSTOLIC BLOOD PRESSURE: 120 MMHG

## 2020-01-15 VITALS — SYSTOLIC BLOOD PRESSURE: 100 MMHG | DIASTOLIC BLOOD PRESSURE: 55 MMHG

## 2020-01-15 VITALS — SYSTOLIC BLOOD PRESSURE: 126 MMHG | DIASTOLIC BLOOD PRESSURE: 63 MMHG

## 2020-01-15 VITALS — SYSTOLIC BLOOD PRESSURE: 125 MMHG | DIASTOLIC BLOOD PRESSURE: 51 MMHG

## 2020-01-15 VITALS — DIASTOLIC BLOOD PRESSURE: 63 MMHG | SYSTOLIC BLOOD PRESSURE: 102 MMHG

## 2020-01-15 VITALS — DIASTOLIC BLOOD PRESSURE: 50 MMHG | SYSTOLIC BLOOD PRESSURE: 121 MMHG

## 2020-01-15 VITALS — DIASTOLIC BLOOD PRESSURE: 64 MMHG | SYSTOLIC BLOOD PRESSURE: 142 MMHG

## 2020-01-15 VITALS — SYSTOLIC BLOOD PRESSURE: 149 MMHG | DIASTOLIC BLOOD PRESSURE: 61 MMHG

## 2020-01-15 VITALS — DIASTOLIC BLOOD PRESSURE: 42 MMHG | SYSTOLIC BLOOD PRESSURE: 135 MMHG

## 2020-01-15 VITALS — DIASTOLIC BLOOD PRESSURE: 60 MMHG | SYSTOLIC BLOOD PRESSURE: 139 MMHG

## 2020-01-15 LAB
ALBUMIN SERPL-MCNC: 2.7 G/DL (ref 3.4–5)
ALP SERPL-CCNC: 37 U/L (ref 46–116)
ALT SERPL-CCNC: 24 U/L (ref 10–68)
ANION GAP SERPL CALC-SCNC: 11.2 MMOL/L (ref 8–16)
BILIRUB SERPL-MCNC: 0.35 MG/DL (ref 0.2–1.3)
BUN SERPL-MCNC: 17 MG/DL (ref 7–18)
CALCIUM SERPL-MCNC: 7.6 MG/DL (ref 8.5–10.1)
CHLORIDE SERPL-SCNC: 104 MMOL/L (ref 98–107)
CO2 SERPL-SCNC: 25.5 MMOL/L (ref 21–32)
CREAT SERPL-MCNC: 0.6 MG/DL (ref 0.6–1.3)
ERYTHROCYTE [DISTWIDTH] IN BLOOD BY AUTOMATED COUNT: 14.2 % (ref 11.5–14.5)
GLOBULIN SER-MCNC: 2.7 G/L
GLUCOSE SERPL-MCNC: 115 MG/DL (ref 74–106)
HCT VFR BLD CALC: 24.1 % (ref 36–48)
HGB BLD-MCNC: 8 G/DL (ref 12–16)
MCH RBC QN AUTO: 30.8 PG (ref 26–34)
MCHC RBC AUTO-ENTMCNC: 33.2 G/DL (ref 31–37)
MCV RBC: 92.7 FL (ref 80–100)
OSMOLALITY SERPL CALC.SUM OF ELEC: 276 MOSM/KG (ref 275–300)
PLATELET # BLD: 220 10X3/UL (ref 130–400)
PMV BLD AUTO: 10.1 FL (ref 7.4–10.4)
POTASSIUM SERPL-SCNC: 3.7 MMOL/L (ref 3.5–5.1)
PROT SERPL-MCNC: 5.4 G/DL (ref 6.4–8.2)
RBC # BLD AUTO: 2.6 10X6/UL (ref 4–5.4)
SODIUM SERPL-SCNC: 137 MMOL/L (ref 136–145)
WBC # BLD AUTO: 16.6 10X3/UL (ref 4.8–10.8)

## 2020-01-15 NOTE — NUR
C/O RESTLESSNESS, INABILITY TO SLEEP. STATES PAIN IS 7/10, PRN PAIN MEDS GIVEN
PER MAR. WILL MONITOR.

## 2020-01-15 NOTE — NUR
REPORT RECEIVED. ASSESSMENT COMPLETE. PT AWAKE AND UP IN CHAIR AT BEDSIDE. C/O
"STROKE" IN HER RIGHT ARM. NO DEFICITS NOTED. EQUAL , NO DRIFT. RIGHT ARM
SLIGHTLY MORE SWOLLEN.

## 2020-01-15 NOTE — MORECARE
CASE MANAGEMENT DISCHARGE SUMMARY
 
 
PATIENT: MARLENA JUAN                          UNIT: [id number]
ACCOUNT#: [id number]                       ADM DATE: 20
AGE: 75     : 47  SEX: F            ROOM/BED: [facility]    
AUTHOR: WILLOW ADAMSON                             PHYSICIAN:                               
 
REFERRING PHYSICIAN: NEY MATA MD            
DATE OF SERVICE: 01/15/20
Discharge Plan
 
 
Patient Name: MARLENA JUAN
Facility: [facility]:[place]
Encounter #: [id number]
Medical Record #: [id number]
: 1945
Planned Disposition: Home
Anticipated Discharge Date: 
 
Discharge Date: 
Expected LOS: 
Initial Reviewer: [id number]
Initial Review Date: 2020
Generated: 1/15/20   5:46 pm 
  
 
 
 
 
 
 
 
Patient Name: MARLENA JUAN
 
Encounter #: [id number]
Page [phone]
 
 
 
 
 
Electronically Signed by WILLOW ADAMSON on 01/15/20 at 1647
 
 
 
 
 
 
**All edits/amendments must be made on the electronic document**
 
DICTATION DATE: 01/15/20 1646     : ALEJANDRA  01/15/20 1646     
RPT#: [id number]                                DC DATE:        
                                               STATUS: ADM IN  
[facility]
 [place] [address]
***END OF REPORT***

## 2020-01-15 NOTE — NUR
BEDSIDE REPORT AND SHIFT ASSESSMENT COMPLETE, SEE FLOWSHEET. PT ALERT AND
ORIENTED. HR 88, NSR ON MONITOR. R IJ CVL SALINE LOCKED, DRESSING CDI. VSS, NO
SIGNS OF ACUTE DISTRESS NOTED. MIDSTERNAL INCISION DRESSING CDI. SUBSTERNAL
INCISION AND PREVIOUS CT SITE, TPM WIRES, DRESSING CDI. SCD'S AND DENI'S IN
PLACE, SKIN WNL. ALL PULSES PALP. 750 PULLED ON IS X 5. PT DENIES ANY NEEDS AT
THIS TIME, CALL LIGHT IN REACH. WILL JACKELINE.

## 2020-01-15 NOTE — NUR
R IJ CVL DRESSING CHANGED. SUBSTERNAL INCISION AND TPM WIRES DRESSING CHANGED.
ASSISTED PT TO BEDSIDE CHAIR, UNSTEADY ON FEET. C/O NAUSEA AND DIZZINESS. VSS.

## 2020-01-15 NOTE — MORECARE
CASE MANAGEMENT DISCHARGE SUMMARY
 
 
PATIENT: MARLENA JUAN                          UNIT: [id number]
ACCOUNT#: [id number]                       ADM DATE: 20
AGE: 75     : 47  SEX: F            ROOM/BED: [facility]    
AUTHOR: JUAN DIEGO,DOC                             PHYSICIAN:                               
 
REFERRING PHYSICIAN: NEY MATA MD            
DATE OF SERVICE: 01/15/20
Discharge Plan
 
 
Patient Name: MARLENA JUAN
Facility: [facility]:[place]
Encounter #: [id number]
Medical Record #: [id number]
: 1945
Planned Disposition: Home
Anticipated Discharge Date: 
 
Discharge Date: 
Expected LOS: 
Initial Reviewer: [email or web address]
Initial Review Date: 2020
Generated: 1/15/20   5:53 pm 
Comments
 
DCP- Discharge Planning
 
Updated by [id number]: Tasha Joiner on 1/15/20   3:51 pm CT
Patient Name: MARLENA JUAN                                     
Admission Status: Urgent   
Account number: [id number]                              
Admission Date: 2020   
: 1945                                                        
Admission Diagnosis:   
Attending: NEY MATA                                                
Current LOS:  2   
  
Anticipated DC Date:    
Planned Disposition: Home   
Primary Insurance: MEDICARE A & B   
  
  
Discharge Planning Comments:   
CM met with patient to complete initial dc planning assessment.  CM educated 
patient on the CM role and verbal consent given by patient to complete 
assessment.   Patient lives at  home with her  where she is 
independent with her care.  At discharge patient plans to return home  and 
 
feels this is a safe discharge.  CM discussed availability of home health, 
rehab services, and medical equipment. Her daughter will be her  home. 
Patient denied known discharge needs at this time. CM will continue to follow 
and will assist as needed with dc plans/needs.    
  
  
  
  
  
: Tasha Joiner
 DCPIA - Discharge Planning Initial Assessment
 
Updated by [id number]: Tasha Joiner on 1/15/20   4:48 pm
*  Is the patient Alert and Oriented?
Yes
*  How many steps to enter\exit or inside your home?
 
*  PCP
KESHA
*  Pharmacy
[facility] [address]
*  Preadmission Environment
Home with Family
*  ADLs
Independent
*  Other Equipment
W/C, WALKER
*  List name and contact numbers for known caregivers / representatives who 
currently or will assist patient after discharge:
MARY ALICE POTTER- DAUGHTER - [phone]
*  Verbal permission to speak to the caregivers and representatives has been 
obtained from the patient.
Yes
*  Community resources currently utilized
None
*  Please name any agencies selected above.
[facility]
*  Additional services required to return to the preadmission environment?
No
*  Can the patient safely return to the preadmission environment?
Yes
*  Has this patient been hospitalized within the prior 30 days at any 
hospital?
No
 
 
 
 
 
 
 
Last DP export: 1/15/20   3:47 p
 
Patient Name: MARLENA JUAN
Encounter #: [id number]
Page [phone]
 
 
 
 
 
Electronically Signed by WILLOW ADAMSON on 01/15/20 at 1654
 
 
 
 
 
 
**All edits/amendments must be made on the electronic document**
 
DICTATION DATE: 01/15/20 1653     : ALEJANDRA  01/15/20 1653     
RPT#: [id number]                                DC DATE:        
                                               STATUS: ADM IN  
[facility]
 [place] [address]
***END OF REPORT***

## 2020-01-15 NOTE — NUR
PT WAS SITTING UP IN CHAIR VISITING WITH FAMILY WHEN HR BECAME TACHY. PT
ASSISTED TO BED AND PLACED ON O2. DR MATA NOTIFIED. NEW ORDERS RECEIVED.
FAMILY REMAINS AT BEDSIDE AND HAS BEEN EXPLAINED WHAT IS GOING ON AND WHAT
MEDICATIONS BEING GIVEN.

## 2020-01-15 NOTE — NUR
C/O "FLOATING" FEELING IN R ARM, STATES SHE DOES NOT HAVE CONTROL OVER THE
ARM. R RADIAL AND BRACHIAL PULSES PALP,  STRENGTH 5/5, NEURO CHECK
PERFORMED SHOWING NO DEFICITS. WILL PASS ALONG IN REPORT.

## 2020-01-15 NOTE — NUR
Nutrition Follow-up:
POD 2 aortic valve replacement. Still experiencing nausea; did not eat this
AM.
Diet: Clear Liquids
Wt: 140.1# (1/15); 139.9# (1/14)
Labs noted: Glu 115, Ca 7.6, Alb 2.7
Meds noted: Zofran, Senokot
-Rec ADAT as medically feasible.
-If unable to advance diet past clear liquids within 24 hours, rec initiate
nutrition support (Procalamine?).
-Monitor wt.
-RD following.

## 2020-01-16 VITALS — SYSTOLIC BLOOD PRESSURE: 112 MMHG | DIASTOLIC BLOOD PRESSURE: 67 MMHG

## 2020-01-16 VITALS — DIASTOLIC BLOOD PRESSURE: 47 MMHG | SYSTOLIC BLOOD PRESSURE: 111 MMHG

## 2020-01-16 VITALS — SYSTOLIC BLOOD PRESSURE: 120 MMHG | DIASTOLIC BLOOD PRESSURE: 69 MMHG

## 2020-01-16 VITALS — SYSTOLIC BLOOD PRESSURE: 135 MMHG | DIASTOLIC BLOOD PRESSURE: 52 MMHG

## 2020-01-16 VITALS — DIASTOLIC BLOOD PRESSURE: 58 MMHG | SYSTOLIC BLOOD PRESSURE: 115 MMHG

## 2020-01-16 VITALS — SYSTOLIC BLOOD PRESSURE: 125 MMHG | DIASTOLIC BLOOD PRESSURE: 53 MMHG

## 2020-01-16 VITALS — DIASTOLIC BLOOD PRESSURE: 53 MMHG | SYSTOLIC BLOOD PRESSURE: 128 MMHG

## 2020-01-16 VITALS — DIASTOLIC BLOOD PRESSURE: 51 MMHG | SYSTOLIC BLOOD PRESSURE: 116 MMHG

## 2020-01-16 VITALS — DIASTOLIC BLOOD PRESSURE: 54 MMHG | SYSTOLIC BLOOD PRESSURE: 111 MMHG

## 2020-01-16 VITALS — SYSTOLIC BLOOD PRESSURE: 134 MMHG | DIASTOLIC BLOOD PRESSURE: 57 MMHG

## 2020-01-16 VITALS — SYSTOLIC BLOOD PRESSURE: 128 MMHG | DIASTOLIC BLOOD PRESSURE: 46 MMHG

## 2020-01-16 VITALS — DIASTOLIC BLOOD PRESSURE: 49 MMHG | SYSTOLIC BLOOD PRESSURE: 98 MMHG

## 2020-01-16 VITALS — DIASTOLIC BLOOD PRESSURE: 70 MMHG | SYSTOLIC BLOOD PRESSURE: 165 MMHG

## 2020-01-16 VITALS — SYSTOLIC BLOOD PRESSURE: 108 MMHG | DIASTOLIC BLOOD PRESSURE: 55 MMHG

## 2020-01-16 VITALS — DIASTOLIC BLOOD PRESSURE: 45 MMHG | SYSTOLIC BLOOD PRESSURE: 127 MMHG

## 2020-01-16 VITALS — DIASTOLIC BLOOD PRESSURE: 52 MMHG | SYSTOLIC BLOOD PRESSURE: 162 MMHG

## 2020-01-16 VITALS — DIASTOLIC BLOOD PRESSURE: 52 MMHG | SYSTOLIC BLOOD PRESSURE: 112 MMHG

## 2020-01-16 VITALS — SYSTOLIC BLOOD PRESSURE: 120 MMHG | DIASTOLIC BLOOD PRESSURE: 45 MMHG

## 2020-01-16 VITALS — DIASTOLIC BLOOD PRESSURE: 59 MMHG | SYSTOLIC BLOOD PRESSURE: 156 MMHG

## 2020-01-16 VITALS — DIASTOLIC BLOOD PRESSURE: 50 MMHG | SYSTOLIC BLOOD PRESSURE: 125 MMHG

## 2020-01-16 VITALS — SYSTOLIC BLOOD PRESSURE: 110 MMHG | DIASTOLIC BLOOD PRESSURE: 55 MMHG

## 2020-01-16 VITALS — DIASTOLIC BLOOD PRESSURE: 62 MMHG | SYSTOLIC BLOOD PRESSURE: 120 MMHG

## 2020-01-16 VITALS — DIASTOLIC BLOOD PRESSURE: 51 MMHG | SYSTOLIC BLOOD PRESSURE: 148 MMHG

## 2020-01-16 VITALS — SYSTOLIC BLOOD PRESSURE: 117 MMHG | DIASTOLIC BLOOD PRESSURE: 51 MMHG

## 2020-01-16 VITALS — SYSTOLIC BLOOD PRESSURE: 105 MMHG | DIASTOLIC BLOOD PRESSURE: 45 MMHG

## 2020-01-16 VITALS — SYSTOLIC BLOOD PRESSURE: 139 MMHG | DIASTOLIC BLOOD PRESSURE: 53 MMHG

## 2020-01-16 VITALS — SYSTOLIC BLOOD PRESSURE: 130 MMHG | DIASTOLIC BLOOD PRESSURE: 64 MMHG

## 2020-01-16 VITALS — DIASTOLIC BLOOD PRESSURE: 46 MMHG | SYSTOLIC BLOOD PRESSURE: 115 MMHG

## 2020-01-16 VITALS — DIASTOLIC BLOOD PRESSURE: 60 MMHG | SYSTOLIC BLOOD PRESSURE: 162 MMHG

## 2020-01-16 VITALS — SYSTOLIC BLOOD PRESSURE: 156 MMHG | DIASTOLIC BLOOD PRESSURE: 67 MMHG

## 2020-01-16 VITALS — DIASTOLIC BLOOD PRESSURE: 51 MMHG | SYSTOLIC BLOOD PRESSURE: 123 MMHG

## 2020-01-16 VITALS — DIASTOLIC BLOOD PRESSURE: 47 MMHG | SYSTOLIC BLOOD PRESSURE: 129 MMHG

## 2020-01-16 VITALS — SYSTOLIC BLOOD PRESSURE: 147 MMHG | DIASTOLIC BLOOD PRESSURE: 67 MMHG

## 2020-01-16 LAB
ALBUMIN SERPL-MCNC: 2.7 G/DL (ref 3.4–5)
ALP SERPL-CCNC: 42 U/L (ref 46–116)
ALT SERPL-CCNC: 24 U/L (ref 10–68)
ANION GAP SERPL CALC-SCNC: 11.9 MMOL/L (ref 8–16)
BILIRUB SERPL-MCNC: 0.33 MG/DL (ref 0.2–1.3)
BUN SERPL-MCNC: 14 MG/DL (ref 7–18)
CALCIUM SERPL-MCNC: 8.1 MG/DL (ref 8.5–10.1)
CHLORIDE SERPL-SCNC: 105 MMOL/L (ref 98–107)
CO2 SERPL-SCNC: 26.9 MMOL/L (ref 21–32)
CREAT SERPL-MCNC: 0.6 MG/DL (ref 0.6–1.3)
ERYTHROCYTE [DISTWIDTH] IN BLOOD BY AUTOMATED COUNT: 14 % (ref 11.5–14.5)
GLOBULIN SER-MCNC: 2.9 G/L
GLUCOSE SERPL-MCNC: 112 MG/DL (ref 74–106)
HCT VFR BLD CALC: 24.2 % (ref 36–48)
HGB BLD-MCNC: 7.9 G/DL (ref 12–16)
MCH RBC QN AUTO: 30.9 PG (ref 26–34)
MCHC RBC AUTO-ENTMCNC: 32.6 G/DL (ref 31–37)
MCV RBC: 94.5 FL (ref 80–100)
OSMOLALITY SERPL CALC.SUM OF ELEC: 280 MOSM/KG (ref 275–300)
PLATELET # BLD: 263 10X3/UL (ref 130–400)
PMV BLD AUTO: 10.8 FL (ref 7.4–10.4)
POTASSIUM SERPL-SCNC: 3.8 MMOL/L (ref 3.5–5.1)
PROT SERPL-MCNC: 5.6 G/DL (ref 6.4–8.2)
RBC # BLD AUTO: 2.56 10X6/UL (ref 4–5.4)
SODIUM SERPL-SCNC: 140 MMOL/L (ref 136–145)
WBC # BLD AUTO: 13.8 10X3/UL (ref 4.8–10.8)

## 2020-01-16 NOTE — NUR
PT A/OX4, LUNGS CLEAR, RIGHT IJ CVL INTACT WITH AMIODARONE GTT INFUSING, DRSG
INTACT TO MIDSTERNAL INCISION, UP TO BSC WITH ASSIST, SCD'S TO BILAT LOWER
LEGS, NO C/O @ THIS TIME, VITALS STABLE

## 2020-01-16 NOTE — NUR
PT CONVERTED TO UNCONTROLLED AFIB RATE 'S, DR MATA NOTIFIED. NEW ORDERS
RECEIVED. ASSISTED PT BACK TO BED, SATARTED AMIO BOLUS AND GTT.

## 2020-01-16 NOTE — NUR
CONTACTED DR MATA ABOUT PT SBP BEING OVER 150. NEW ORDER RECEIVED TO GIVE
LOPRESSOR EARLY AND CAN START NITRO GTT IF NEEDED.

## 2020-01-16 NOTE — NUR
PT ASSISTED UP TO BEDSIDE COMMODE. HAD URINATED SOME IN BEDPAN, BUT WAS UNABLE
TO COMPLETE. UNABLE TO URINATE ON COMMODE. ASSISTED BACK TO BED AT PT REQUEST.
CALL LIGHT IN REACH.
DAUGHTER AT BEDSIDE AT THIS TIME.

## 2020-01-16 NOTE — NUR
REASSESSMENT COMPLETE, SEE FLOWSHEET. VSS, NO SIGNS OF DISTRESS NOTED. RESTING
QUIETLY IN BED. CALL LIGHT IN REACH, BED IN LOWEST POSITION.

## 2020-01-17 VITALS — DIASTOLIC BLOOD PRESSURE: 62 MMHG | SYSTOLIC BLOOD PRESSURE: 151 MMHG

## 2020-01-17 VITALS — DIASTOLIC BLOOD PRESSURE: 56 MMHG | SYSTOLIC BLOOD PRESSURE: 150 MMHG

## 2020-01-17 VITALS — DIASTOLIC BLOOD PRESSURE: 67 MMHG | SYSTOLIC BLOOD PRESSURE: 188 MMHG

## 2020-01-17 VITALS — SYSTOLIC BLOOD PRESSURE: 132 MMHG | DIASTOLIC BLOOD PRESSURE: 59 MMHG

## 2020-01-17 VITALS — SYSTOLIC BLOOD PRESSURE: 148 MMHG | DIASTOLIC BLOOD PRESSURE: 46 MMHG

## 2020-01-17 VITALS — SYSTOLIC BLOOD PRESSURE: 183 MMHG | DIASTOLIC BLOOD PRESSURE: 69 MMHG

## 2020-01-17 VITALS — SYSTOLIC BLOOD PRESSURE: 165 MMHG | DIASTOLIC BLOOD PRESSURE: 51 MMHG

## 2020-01-17 VITALS — DIASTOLIC BLOOD PRESSURE: 56 MMHG | SYSTOLIC BLOOD PRESSURE: 157 MMHG

## 2020-01-17 VITALS — SYSTOLIC BLOOD PRESSURE: 177 MMHG | DIASTOLIC BLOOD PRESSURE: 59 MMHG

## 2020-01-17 VITALS — SYSTOLIC BLOOD PRESSURE: 122 MMHG | DIASTOLIC BLOOD PRESSURE: 36 MMHG

## 2020-01-17 VITALS — DIASTOLIC BLOOD PRESSURE: 58 MMHG | SYSTOLIC BLOOD PRESSURE: 148 MMHG

## 2020-01-17 VITALS — SYSTOLIC BLOOD PRESSURE: 168 MMHG | DIASTOLIC BLOOD PRESSURE: 55 MMHG

## 2020-01-17 VITALS — SYSTOLIC BLOOD PRESSURE: 143 MMHG | DIASTOLIC BLOOD PRESSURE: 59 MMHG

## 2020-01-17 VITALS — DIASTOLIC BLOOD PRESSURE: 59 MMHG | SYSTOLIC BLOOD PRESSURE: 137 MMHG

## 2020-01-17 VITALS — DIASTOLIC BLOOD PRESSURE: 51 MMHG | SYSTOLIC BLOOD PRESSURE: 165 MMHG

## 2020-01-17 VITALS — SYSTOLIC BLOOD PRESSURE: 125 MMHG | DIASTOLIC BLOOD PRESSURE: 48 MMHG

## 2020-01-17 VITALS — SYSTOLIC BLOOD PRESSURE: 147 MMHG | DIASTOLIC BLOOD PRESSURE: 54 MMHG

## 2020-01-17 VITALS — DIASTOLIC BLOOD PRESSURE: 56 MMHG | SYSTOLIC BLOOD PRESSURE: 143 MMHG

## 2020-01-17 VITALS — DIASTOLIC BLOOD PRESSURE: 55 MMHG | SYSTOLIC BLOOD PRESSURE: 144 MMHG

## 2020-01-17 VITALS — SYSTOLIC BLOOD PRESSURE: 163 MMHG | DIASTOLIC BLOOD PRESSURE: 47 MMHG

## 2020-01-17 VITALS — DIASTOLIC BLOOD PRESSURE: 62 MMHG | SYSTOLIC BLOOD PRESSURE: 142 MMHG

## 2020-01-17 VITALS — DIASTOLIC BLOOD PRESSURE: 74 MMHG | SYSTOLIC BLOOD PRESSURE: 167 MMHG

## 2020-01-17 VITALS — SYSTOLIC BLOOD PRESSURE: 139 MMHG | DIASTOLIC BLOOD PRESSURE: 58 MMHG

## 2020-01-17 VITALS — DIASTOLIC BLOOD PRESSURE: 69 MMHG | SYSTOLIC BLOOD PRESSURE: 183 MMHG

## 2020-01-17 VITALS — DIASTOLIC BLOOD PRESSURE: 52 MMHG | SYSTOLIC BLOOD PRESSURE: 153 MMHG

## 2020-01-17 VITALS — DIASTOLIC BLOOD PRESSURE: 57 MMHG | SYSTOLIC BLOOD PRESSURE: 157 MMHG

## 2020-01-17 VITALS — DIASTOLIC BLOOD PRESSURE: 55 MMHG | SYSTOLIC BLOOD PRESSURE: 168 MMHG

## 2020-01-17 VITALS — SYSTOLIC BLOOD PRESSURE: 168 MMHG | DIASTOLIC BLOOD PRESSURE: 68 MMHG

## 2020-01-17 VITALS — SYSTOLIC BLOOD PRESSURE: 133 MMHG | DIASTOLIC BLOOD PRESSURE: 57 MMHG

## 2020-01-17 VITALS — SYSTOLIC BLOOD PRESSURE: 132 MMHG | DIASTOLIC BLOOD PRESSURE: 51 MMHG

## 2020-01-17 VITALS — DIASTOLIC BLOOD PRESSURE: 64 MMHG | SYSTOLIC BLOOD PRESSURE: 136 MMHG

## 2020-01-17 VITALS — SYSTOLIC BLOOD PRESSURE: 174 MMHG | DIASTOLIC BLOOD PRESSURE: 68 MMHG

## 2020-01-17 VITALS — DIASTOLIC BLOOD PRESSURE: 54 MMHG | SYSTOLIC BLOOD PRESSURE: 156 MMHG

## 2020-01-17 VITALS — SYSTOLIC BLOOD PRESSURE: 131 MMHG | DIASTOLIC BLOOD PRESSURE: 51 MMHG

## 2020-01-17 VITALS — DIASTOLIC BLOOD PRESSURE: 62 MMHG | SYSTOLIC BLOOD PRESSURE: 138 MMHG

## 2020-01-17 VITALS — SYSTOLIC BLOOD PRESSURE: 167 MMHG | DIASTOLIC BLOOD PRESSURE: 62 MMHG

## 2020-01-17 VITALS — DIASTOLIC BLOOD PRESSURE: 41 MMHG | SYSTOLIC BLOOD PRESSURE: 135 MMHG

## 2020-01-17 VITALS — DIASTOLIC BLOOD PRESSURE: 48 MMHG | SYSTOLIC BLOOD PRESSURE: 125 MMHG

## 2020-01-17 VITALS — SYSTOLIC BLOOD PRESSURE: 130 MMHG | DIASTOLIC BLOOD PRESSURE: 59 MMHG

## 2020-01-17 VITALS — DIASTOLIC BLOOD PRESSURE: 56 MMHG | SYSTOLIC BLOOD PRESSURE: 161 MMHG

## 2020-01-17 VITALS — DIASTOLIC BLOOD PRESSURE: 46 MMHG | SYSTOLIC BLOOD PRESSURE: 133 MMHG

## 2020-01-17 VITALS — SYSTOLIC BLOOD PRESSURE: 154 MMHG | DIASTOLIC BLOOD PRESSURE: 98 MMHG

## 2020-01-17 VITALS — DIASTOLIC BLOOD PRESSURE: 64 MMHG | SYSTOLIC BLOOD PRESSURE: 140 MMHG

## 2020-01-17 VITALS — DIASTOLIC BLOOD PRESSURE: 59 MMHG | SYSTOLIC BLOOD PRESSURE: 129 MMHG

## 2020-01-17 VITALS — SYSTOLIC BLOOD PRESSURE: 163 MMHG | DIASTOLIC BLOOD PRESSURE: 55 MMHG

## 2020-01-17 VITALS — SYSTOLIC BLOOD PRESSURE: 142 MMHG | DIASTOLIC BLOOD PRESSURE: 52 MMHG

## 2020-01-17 VITALS — SYSTOLIC BLOOD PRESSURE: 154 MMHG | DIASTOLIC BLOOD PRESSURE: 47 MMHG

## 2020-01-17 VITALS — DIASTOLIC BLOOD PRESSURE: 47 MMHG | SYSTOLIC BLOOD PRESSURE: 157 MMHG

## 2020-01-17 VITALS — DIASTOLIC BLOOD PRESSURE: 57 MMHG | SYSTOLIC BLOOD PRESSURE: 154 MMHG

## 2020-01-17 VITALS — SYSTOLIC BLOOD PRESSURE: 135 MMHG | DIASTOLIC BLOOD PRESSURE: 49 MMHG

## 2020-01-17 VITALS — DIASTOLIC BLOOD PRESSURE: 58 MMHG | SYSTOLIC BLOOD PRESSURE: 131 MMHG

## 2020-01-17 VITALS — DIASTOLIC BLOOD PRESSURE: 47 MMHG | SYSTOLIC BLOOD PRESSURE: 166 MMHG

## 2020-01-17 VITALS — SYSTOLIC BLOOD PRESSURE: 156 MMHG | DIASTOLIC BLOOD PRESSURE: 62 MMHG

## 2020-01-17 VITALS — SYSTOLIC BLOOD PRESSURE: 166 MMHG | DIASTOLIC BLOOD PRESSURE: 75 MMHG

## 2020-01-17 VITALS — DIASTOLIC BLOOD PRESSURE: 57 MMHG | SYSTOLIC BLOOD PRESSURE: 103 MMHG

## 2020-01-17 VITALS — SYSTOLIC BLOOD PRESSURE: 155 MMHG | DIASTOLIC BLOOD PRESSURE: 53 MMHG

## 2020-01-17 VITALS — DIASTOLIC BLOOD PRESSURE: 60 MMHG | SYSTOLIC BLOOD PRESSURE: 143 MMHG

## 2020-01-17 VITALS — SYSTOLIC BLOOD PRESSURE: 150 MMHG | DIASTOLIC BLOOD PRESSURE: 57 MMHG

## 2020-01-17 VITALS — DIASTOLIC BLOOD PRESSURE: 57 MMHG | SYSTOLIC BLOOD PRESSURE: 143 MMHG

## 2020-01-17 VITALS — DIASTOLIC BLOOD PRESSURE: 56 MMHG | SYSTOLIC BLOOD PRESSURE: 167 MMHG

## 2020-01-17 LAB
ALBUMIN SERPL-MCNC: 2.4 G/DL (ref 3.4–5)
ALP SERPL-CCNC: 53 U/L (ref 46–116)
ALT SERPL-CCNC: 32 U/L (ref 10–68)
ANION GAP SERPL CALC-SCNC: 5.7 MMOL/L (ref 8–16)
BILIRUB SERPL-MCNC: 0.41 MG/DL (ref 0.2–1.3)
BUN SERPL-MCNC: 10 MG/DL (ref 7–18)
CALCIUM SERPL-MCNC: 7.5 MG/DL (ref 8.5–10.1)
CHLORIDE SERPL-SCNC: 106 MMOL/L (ref 98–107)
CO2 SERPL-SCNC: 29.7 MMOL/L (ref 21–32)
CREAT SERPL-MCNC: 0.6 MG/DL (ref 0.6–1.3)
ERYTHROCYTE [DISTWIDTH] IN BLOOD BY AUTOMATED COUNT: 14.1 % (ref 11.5–14.5)
GLOBULIN SER-MCNC: 2.9 G/L
GLUCOSE SERPL-MCNC: 120 MG/DL (ref 74–106)
HCT VFR BLD CALC: 28.8 % (ref 36–48)
HGB BLD-MCNC: 9.5 G/DL (ref 12–16)
MCH RBC QN AUTO: 31.1 PG (ref 26–34)
MCHC RBC AUTO-ENTMCNC: 33 G/DL (ref 31–37)
MCV RBC: 94.4 FL (ref 80–100)
OSMOLALITY SERPL CALC.SUM OF ELEC: 275 MOSM/KG (ref 275–300)
PLATELET # BLD: 271 10X3/UL (ref 130–400)
PMV BLD AUTO: 10.9 FL (ref 7.4–10.4)
POTASSIUM SERPL-SCNC: 3.4 MMOL/L (ref 3.5–5.1)
PROT SERPL-MCNC: 5.3 G/DL (ref 6.4–8.2)
RBC # BLD AUTO: 3.05 10X6/UL (ref 4–5.4)
SODIUM SERPL-SCNC: 138 MMOL/L (ref 136–145)
WBC # BLD AUTO: 11.2 10X3/UL (ref 4.8–10.8)

## 2020-01-17 NOTE — NUR
PT SLEEPING WITH NO DISTRESS NOTED, LUNGS CLEAR, RIGHT IJ INTACT WITH NITRO
GTT INFUSING @ 7 MCG, PACER WIRES INTACT, VITALS STABLE, NO DISTRESS NOTED

## 2020-01-17 NOTE — NUR
Nutrition Follow-up:
POD 4 aortic valve replacement. Tolerated solids this AM. Denies N/V. Multiple
BMs overnight; RN reports that Colace was held this AM.
Diet: CL -> Regular
Wt: 142.1# (1/17); 140.1# (1/15); 137# (1/13)
Labs noted: K+ 3.4, Glu 120, Ca 7.5, Alb 2.4
Meds noted: Micro K, KCl, Senokot, Colace
-May consider cardiac diet.
-Monitor wt.
-RD following.

## 2020-01-18 VITALS — DIASTOLIC BLOOD PRESSURE: 50 MMHG | SYSTOLIC BLOOD PRESSURE: 134 MMHG

## 2020-01-18 VITALS — DIASTOLIC BLOOD PRESSURE: 54 MMHG | SYSTOLIC BLOOD PRESSURE: 137 MMHG

## 2020-01-18 VITALS — SYSTOLIC BLOOD PRESSURE: 119 MMHG | DIASTOLIC BLOOD PRESSURE: 53 MMHG

## 2020-01-18 VITALS — SYSTOLIC BLOOD PRESSURE: 162 MMHG | DIASTOLIC BLOOD PRESSURE: 62 MMHG

## 2020-01-18 VITALS — SYSTOLIC BLOOD PRESSURE: 161 MMHG | DIASTOLIC BLOOD PRESSURE: 57 MMHG

## 2020-01-18 VITALS — DIASTOLIC BLOOD PRESSURE: 54 MMHG | SYSTOLIC BLOOD PRESSURE: 121 MMHG

## 2020-01-18 VITALS — SYSTOLIC BLOOD PRESSURE: 133 MMHG | DIASTOLIC BLOOD PRESSURE: 47 MMHG

## 2020-01-18 VITALS — SYSTOLIC BLOOD PRESSURE: 152 MMHG | DIASTOLIC BLOOD PRESSURE: 62 MMHG

## 2020-01-18 VITALS — SYSTOLIC BLOOD PRESSURE: 126 MMHG | DIASTOLIC BLOOD PRESSURE: 53 MMHG

## 2020-01-18 VITALS — DIASTOLIC BLOOD PRESSURE: 64 MMHG | SYSTOLIC BLOOD PRESSURE: 162 MMHG

## 2020-01-18 VITALS — DIASTOLIC BLOOD PRESSURE: 67 MMHG | SYSTOLIC BLOOD PRESSURE: 166 MMHG

## 2020-01-18 VITALS — DIASTOLIC BLOOD PRESSURE: 62 MMHG | SYSTOLIC BLOOD PRESSURE: 158 MMHG

## 2020-01-18 VITALS — SYSTOLIC BLOOD PRESSURE: 156 MMHG | DIASTOLIC BLOOD PRESSURE: 63 MMHG

## 2020-01-18 VITALS — SYSTOLIC BLOOD PRESSURE: 157 MMHG | DIASTOLIC BLOOD PRESSURE: 62 MMHG

## 2020-01-18 VITALS — SYSTOLIC BLOOD PRESSURE: 158 MMHG | DIASTOLIC BLOOD PRESSURE: 65 MMHG

## 2020-01-18 VITALS — DIASTOLIC BLOOD PRESSURE: 51 MMHG | SYSTOLIC BLOOD PRESSURE: 144 MMHG

## 2020-01-18 VITALS — SYSTOLIC BLOOD PRESSURE: 147 MMHG | DIASTOLIC BLOOD PRESSURE: 60 MMHG

## 2020-01-18 VITALS — SYSTOLIC BLOOD PRESSURE: 158 MMHG | DIASTOLIC BLOOD PRESSURE: 68 MMHG

## 2020-01-18 VITALS — SYSTOLIC BLOOD PRESSURE: 135 MMHG | DIASTOLIC BLOOD PRESSURE: 49 MMHG

## 2020-01-18 VITALS — DIASTOLIC BLOOD PRESSURE: 54 MMHG | SYSTOLIC BLOOD PRESSURE: 124 MMHG

## 2020-01-18 VITALS — SYSTOLIC BLOOD PRESSURE: 114 MMHG | DIASTOLIC BLOOD PRESSURE: 57 MMHG

## 2020-01-18 VITALS — SYSTOLIC BLOOD PRESSURE: 152 MMHG | DIASTOLIC BLOOD PRESSURE: 42 MMHG

## 2020-01-18 VITALS — DIASTOLIC BLOOD PRESSURE: 63 MMHG | SYSTOLIC BLOOD PRESSURE: 141 MMHG

## 2020-01-18 VITALS — SYSTOLIC BLOOD PRESSURE: 143 MMHG | DIASTOLIC BLOOD PRESSURE: 65 MMHG

## 2020-01-18 VITALS — DIASTOLIC BLOOD PRESSURE: 53 MMHG | SYSTOLIC BLOOD PRESSURE: 126 MMHG

## 2020-01-18 VITALS — DIASTOLIC BLOOD PRESSURE: 55 MMHG | SYSTOLIC BLOOD PRESSURE: 146 MMHG

## 2020-01-18 VITALS — DIASTOLIC BLOOD PRESSURE: 54 MMHG | SYSTOLIC BLOOD PRESSURE: 145 MMHG

## 2020-01-18 VITALS — SYSTOLIC BLOOD PRESSURE: 128 MMHG | DIASTOLIC BLOOD PRESSURE: 51 MMHG

## 2020-01-18 VITALS — SYSTOLIC BLOOD PRESSURE: 143 MMHG | DIASTOLIC BLOOD PRESSURE: 53 MMHG

## 2020-01-18 VITALS — DIASTOLIC BLOOD PRESSURE: 72 MMHG | SYSTOLIC BLOOD PRESSURE: 124 MMHG

## 2020-01-18 VITALS — DIASTOLIC BLOOD PRESSURE: 71 MMHG | SYSTOLIC BLOOD PRESSURE: 125 MMHG

## 2020-01-18 VITALS — SYSTOLIC BLOOD PRESSURE: 130 MMHG | DIASTOLIC BLOOD PRESSURE: 61 MMHG

## 2020-01-18 VITALS — DIASTOLIC BLOOD PRESSURE: 70 MMHG | SYSTOLIC BLOOD PRESSURE: 148 MMHG

## 2020-01-18 VITALS — DIASTOLIC BLOOD PRESSURE: 46 MMHG | SYSTOLIC BLOOD PRESSURE: 141 MMHG

## 2020-01-18 VITALS — SYSTOLIC BLOOD PRESSURE: 103 MMHG | DIASTOLIC BLOOD PRESSURE: 61 MMHG

## 2020-01-18 VITALS — SYSTOLIC BLOOD PRESSURE: 159 MMHG | DIASTOLIC BLOOD PRESSURE: 58 MMHG

## 2020-01-18 VITALS — SYSTOLIC BLOOD PRESSURE: 124 MMHG | DIASTOLIC BLOOD PRESSURE: 61 MMHG

## 2020-01-18 VITALS — SYSTOLIC BLOOD PRESSURE: 121 MMHG | DIASTOLIC BLOOD PRESSURE: 70 MMHG

## 2020-01-18 VITALS — SYSTOLIC BLOOD PRESSURE: 163 MMHG | DIASTOLIC BLOOD PRESSURE: 59 MMHG

## 2020-01-18 VITALS — SYSTOLIC BLOOD PRESSURE: 145 MMHG | DIASTOLIC BLOOD PRESSURE: 51 MMHG

## 2020-01-18 VITALS — DIASTOLIC BLOOD PRESSURE: 58 MMHG | SYSTOLIC BLOOD PRESSURE: 141 MMHG

## 2020-01-18 VITALS — SYSTOLIC BLOOD PRESSURE: 150 MMHG | DIASTOLIC BLOOD PRESSURE: 64 MMHG

## 2020-01-18 VITALS — DIASTOLIC BLOOD PRESSURE: 55 MMHG | SYSTOLIC BLOOD PRESSURE: 118 MMHG

## 2020-01-18 VITALS — SYSTOLIC BLOOD PRESSURE: 149 MMHG | DIASTOLIC BLOOD PRESSURE: 62 MMHG

## 2020-01-18 VITALS — SYSTOLIC BLOOD PRESSURE: 185 MMHG | DIASTOLIC BLOOD PRESSURE: 68 MMHG

## 2020-01-18 VITALS — SYSTOLIC BLOOD PRESSURE: 114 MMHG | DIASTOLIC BLOOD PRESSURE: 51 MMHG

## 2020-01-18 VITALS — DIASTOLIC BLOOD PRESSURE: 60 MMHG | SYSTOLIC BLOOD PRESSURE: 154 MMHG

## 2020-01-18 VITALS — DIASTOLIC BLOOD PRESSURE: 54 MMHG | SYSTOLIC BLOOD PRESSURE: 148 MMHG

## 2020-01-18 VITALS — DIASTOLIC BLOOD PRESSURE: 52 MMHG | SYSTOLIC BLOOD PRESSURE: 127 MMHG

## 2020-01-18 VITALS — DIASTOLIC BLOOD PRESSURE: 64 MMHG | SYSTOLIC BLOOD PRESSURE: 138 MMHG

## 2020-01-18 VITALS — DIASTOLIC BLOOD PRESSURE: 75 MMHG | SYSTOLIC BLOOD PRESSURE: 178 MMHG

## 2020-01-18 VITALS — DIASTOLIC BLOOD PRESSURE: 55 MMHG | SYSTOLIC BLOOD PRESSURE: 153 MMHG

## 2020-01-18 VITALS — DIASTOLIC BLOOD PRESSURE: 52 MMHG | SYSTOLIC BLOOD PRESSURE: 146 MMHG

## 2020-01-18 VITALS — SYSTOLIC BLOOD PRESSURE: 129 MMHG | DIASTOLIC BLOOD PRESSURE: 61 MMHG

## 2020-01-18 VITALS — SYSTOLIC BLOOD PRESSURE: 137 MMHG | DIASTOLIC BLOOD PRESSURE: 56 MMHG

## 2020-01-18 LAB
ALBUMIN SERPL-MCNC: 2.7 G/DL (ref 3.4–5)
ALP SERPL-CCNC: 60 U/L (ref 46–116)
ALT SERPL-CCNC: 46 U/L (ref 10–68)
ANION GAP SERPL CALC-SCNC: 10.6 MMOL/L (ref 8–16)
BILIRUB SERPL-MCNC: 0.48 MG/DL (ref 0.2–1.3)
BUN SERPL-MCNC: 10 MG/DL (ref 7–18)
CALCIUM SERPL-MCNC: 8.4 MG/DL (ref 8.5–10.1)
CHLORIDE SERPL-SCNC: 106 MMOL/L (ref 98–107)
CO2 SERPL-SCNC: 29.1 MMOL/L (ref 21–32)
CREAT SERPL-MCNC: 0.6 MG/DL (ref 0.6–1.3)
ERYTHROCYTE [DISTWIDTH] IN BLOOD BY AUTOMATED COUNT: 14.1 % (ref 11.5–14.5)
GLOBULIN SER-MCNC: 3.2 G/L
GLUCOSE SERPL-MCNC: 107 MG/DL (ref 74–106)
HCT VFR BLD CALC: 31.9 % (ref 36–48)
HGB BLD-MCNC: 10.6 G/DL (ref 12–16)
MCH RBC QN AUTO: 31.3 PG (ref 26–34)
MCHC RBC AUTO-ENTMCNC: 33.2 G/DL (ref 31–37)
MCV RBC: 94.1 FL (ref 80–100)
OSMOLALITY SERPL CALC.SUM OF ELEC: 279 MOSM/KG (ref 275–300)
PLATELET # BLD: 329 10X3/UL (ref 130–400)
PMV BLD AUTO: 9.4 FL (ref 7.4–10.4)
POTASSIUM SERPL-SCNC: 4.7 MMOL/L (ref 3.5–5.1)
PROT SERPL-MCNC: 5.9 G/DL (ref 6.4–8.2)
RBC # BLD AUTO: 3.39 10X6/UL (ref 4–5.4)
SODIUM SERPL-SCNC: 141 MMOL/L (ref 136–145)
WBC # BLD AUTO: 13.4 10X3/UL (ref 4.8–10.8)

## 2020-01-18 NOTE — NUR
1200:  IV STARTED L FOREARM WITH 2OG ON 2ND ATTEMPT.  SITE DRESSED WITH
TEGADERM.
1205:  R IJ DC'D.  MANUAL PRESSURE HELD AND SITE DRESSED WITH 2X2 AND
TEGADERM.

## 2020-01-19 VITALS — SYSTOLIC BLOOD PRESSURE: 142 MMHG | DIASTOLIC BLOOD PRESSURE: 53 MMHG

## 2020-01-19 VITALS — SYSTOLIC BLOOD PRESSURE: 166 MMHG | DIASTOLIC BLOOD PRESSURE: 63 MMHG

## 2020-01-19 VITALS — SYSTOLIC BLOOD PRESSURE: 160 MMHG | DIASTOLIC BLOOD PRESSURE: 64 MMHG

## 2020-01-19 VITALS — DIASTOLIC BLOOD PRESSURE: 46 MMHG | SYSTOLIC BLOOD PRESSURE: 118 MMHG

## 2020-01-19 VITALS — SYSTOLIC BLOOD PRESSURE: 56 MMHG | DIASTOLIC BLOOD PRESSURE: 30 MMHG

## 2020-01-19 VITALS — DIASTOLIC BLOOD PRESSURE: 31 MMHG | SYSTOLIC BLOOD PRESSURE: 59 MMHG

## 2020-01-19 VITALS — DIASTOLIC BLOOD PRESSURE: 50 MMHG | SYSTOLIC BLOOD PRESSURE: 151 MMHG

## 2020-01-19 VITALS — SYSTOLIC BLOOD PRESSURE: 139 MMHG | DIASTOLIC BLOOD PRESSURE: 53 MMHG

## 2020-01-19 VITALS — DIASTOLIC BLOOD PRESSURE: 65 MMHG | SYSTOLIC BLOOD PRESSURE: 182 MMHG

## 2020-01-19 VITALS — DIASTOLIC BLOOD PRESSURE: 52 MMHG | SYSTOLIC BLOOD PRESSURE: 127 MMHG

## 2020-01-19 VITALS — DIASTOLIC BLOOD PRESSURE: 56 MMHG | SYSTOLIC BLOOD PRESSURE: 151 MMHG

## 2020-01-19 VITALS — SYSTOLIC BLOOD PRESSURE: 145 MMHG | DIASTOLIC BLOOD PRESSURE: 51 MMHG

## 2020-01-19 VITALS — DIASTOLIC BLOOD PRESSURE: 53 MMHG | SYSTOLIC BLOOD PRESSURE: 143 MMHG

## 2020-01-19 PROCEDURE — 02QA0ZZ REPAIR HEART, OPEN APPROACH: ICD-10-PCS | Performed by: THORACIC SURGERY (CARDIOTHORACIC VASCULAR SURGERY)

## 2020-01-19 NOTE — NUR
0715:  AWAKE AND ALERT.  SITTING IN CHAIR.  SEE FLOWSHEET FOR ASSESSMENT.
0833:  C/O PAIN IN CHEST.  PERCOCET GIVEN FOR PAIN.
0835:  AMBULATED WITH PT 250FT WITH MINIMAL ASSISTANCE.
0952:  C/O PAIN IN L SHOULDER AND ACROSS LOW BACK.  TYLENOL 650MG GIVEN PO.
REMAINS UP IN CHAIR.  SON A BEDSIDE.
1019:  CALLED INTO ROOM.  C/O NAUSEA AND FEELING BAD.  PLAN TO GIVE ZOFRAN.
1020:  RETURNED TO ROOM WITH ZOFAN.  PATIENT FOUND UNRESPONSIVE IN CHAIR AND
WITH BLOWING RESPIRATIONS.  CHAIR RECLINED IN FURTHEREST POSITION AND CODE
BLUE CALLED.  SEE CODE BLUE SHEET.
1225:  CODE BLUE CALLED AND PATIENT PRONOUNCED.
1245:  FAMILY IN ROOM FOR VIEWING OF BODY.
1515:  [facility] FAMILY REQUEST.

## 2020-01-19 NOTE — NUR
PT AWOKE, C/O NIGHTMARES WITH SOMEONE CHASING HER, REASSURED PT SHE WAS OK,
RETURNED TO SLEEP, NO DISTRESS

## 2020-01-19 NOTE — MORECARE
CASE MANAGEMENT DISCHARGE SUMMARY
 
 
PATIENT: MARLENA JUAN                          UNIT: [id number]
ACCOUNT#: [id number]                       ADM DATE: 20
AGE: 75     : 47  SEX: F            ROOM/BED: [facility]    
AUTHOR: WILLOW ADAMSON                             PHYSICIAN:                               
 
REFERRING PHYSICIAN: NEY MATA MD            
DATE OF SERVICE: 20
Discharge Plan
 
 
Patient Name: MARLENA JUAN
Facility: [facility]:[place]
Encounter #: [id number]
Medical Record #: [id number]
: 1945
Planned Disposition: Home
Anticipated Discharge Date: 
 
Discharge Date: 2020
Expected LOS: 
Initial Reviewer: [id number]
Initial Review Date: 2020
Generated: 20   8:18 pm 
Comments
 
DCP- Discharge Planning
 
Updated by [email or web address]: Tasha Joiner on 1/15/20   3:51 pm CT
Patient Name: MARLENA JUAN                                     
Admission Status: Urgent   
Account number: [id number]                              
Admission Date: 2020   
: 1945                                                        
Admission Diagnosis:   
Attending: NEY MATA                                                
Current LOS:  2   
  
Anticipated DC Date:    
Planned Disposition: Home   
Primary Insurance: MEDICARE A & B   
  
  
Discharge Planning Comments:   
CM met with patient to complete initial dc planning assessment.  CM educated 
patient on the CM role and verbal consent given by patient to complete 
assessment.   Patient lives at  home with her  where she is 
independent with her care.  At discharge patient plans to return home  and 
 
feels this is a safe discharge.  CM discussed availability of home health, 
rehab services, and medical equipment. Her daughter will be her  home. 
Patient denied known discharge needs at this time. CM will continue to follow 
and will assist as needed with dc plans/needs.    
  
  
  
  
  
: Tasha Joiner
 DCPIA - Discharge Planning Initial Assessment
 
Updated by [id number]: Tasha Joiner on 1/15/20   4:48 pm
*  Is the patient Alert and Oriented?
Yes
*  How many steps to enter\exit or inside your home?
 
*  PCP
KESHA
*  Pharmacy
[facility] [address]
*  Preadmission Environment
Home with Family
*  ADLs
Independent
*  Other Equipment
W/C, WALKER
*  List name and contact numbers for known caregivers / representatives who 
currently or will assist patient after discharge:
MARY ALICE POTTER- DAUGHTER - [phone]
*  Verbal permission to speak to the caregivers and representatives has been 
obtained from the patient.
Yes
*  Community resources currently utilized
None
*  Please name any agencies selected above.
[facility]
*  Additional services required to return to the preadmission environment?
No
*  Can the patient safely return to the preadmission environment?
Yes
*  Has this patient been hospitalized within the prior 30 days at any 
hospital?
No
 
 
 
 
 
 
 
Last DP export: 1/15/20   3:54 p
 
Patient Name: MARLENA JUAN
Encounter #: [id number]
Page [phone]
 
 
 
 
 
Electronically Signed by WILLOW ADAMSON on 20 at 1919
 
 
 
 
 
 
**All edits/amendments must be made on the electronic document**
 
DICTATION DATE: 20     : ALEJANDRA  20     
RPT#: [id number]                                DC DATE:20
                                               STATUS: DIS IN  
[facility]
 [place] [address]
***END OF REPORT***

## 2021-10-26 NOTE — NUR
Frankie La Nena Bearden  [address] [address]    11/02/21      Estimado (a) Frankie:    Hemos tratado de comunicarnos con usted por teléfono y no hemos podido. Le estamos escribiendo para notificarle que raúl análisis de birgit salieron normales o estan estables. Si tiene preguntas por favor comuníquese con la Clínica y pida hablar con arley de las enfermeras.    Se puede comunicar al [phone] de lunes a viernes de 9:00a.m - 4:00p.m.    Dr. Esteban Roqi  [address]  [place] [address]  Telephone: [phone]   AWAKE, TAKES PO MEDS WITHOUT DIFFICULTY, WILL CONT TO MONITOR